# Patient Record
Sex: MALE | Race: OTHER | NOT HISPANIC OR LATINO | Employment: FULL TIME | URBAN - METROPOLITAN AREA
[De-identification: names, ages, dates, MRNs, and addresses within clinical notes are randomized per-mention and may not be internally consistent; named-entity substitution may affect disease eponyms.]

---

## 2024-07-24 ENCOUNTER — OFFICE VISIT (OUTPATIENT)
Dept: ORTHOPEDIC SURGERY | Facility: HOSPITAL | Age: 34
End: 2024-07-24
Payer: COMMERCIAL

## 2024-07-24 ENCOUNTER — HOSPITAL ENCOUNTER (OUTPATIENT)
Dept: RADIOLOGY | Facility: HOSPITAL | Age: 34
Discharge: HOME | End: 2024-07-24
Payer: COMMERCIAL

## 2024-07-24 DIAGNOSIS — M25.561 RIGHT KNEE PAIN, UNSPECIFIED CHRONICITY: ICD-10-CM

## 2024-07-24 DIAGNOSIS — S83.511A DISRUPTION OF ANTERIOR CRUCIATE LIGAMENT OF KNEE, RIGHT, INITIAL ENCOUNTER: Primary | ICD-10-CM

## 2024-07-24 DIAGNOSIS — S83.241A ACUTE MEDIAL MENISCUS TEAR, RIGHT, INITIAL ENCOUNTER: ICD-10-CM

## 2024-07-24 PROCEDURE — 73564 X-RAY EXAM KNEE 4 OR MORE: CPT | Mod: RT

## 2024-07-24 PROCEDURE — 99203 OFFICE O/P NEW LOW 30 MIN: CPT | Performed by: ORTHOPAEDIC SURGERY

## 2024-07-24 PROCEDURE — 73564 X-RAY EXAM KNEE 4 OR MORE: CPT | Mod: RIGHT SIDE | Performed by: RADIOLOGY

## 2024-07-24 PROCEDURE — 99213 OFFICE O/P EST LOW 20 MIN: CPT | Performed by: ORTHOPAEDIC SURGERY

## 2024-07-24 NOTE — PROGRESS NOTES
Chief Complaint: Pain of the Right Knee       HPI:  Tobi Rosa is a 33 y.o. 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  He reports feeling a popping sensation at the time of injury.  He was seen previously in the urgent care where he was provided with a hinged knee brace which is helping his symptoms.  He initially used crutches for ambulation and is currently using a cane on occasion.  Today he does not present with any type of assistive device for ambulation.  He continues to report a popping and buckling sensation over the medial aspect of his knee that worsens with twisting, standing and his combat related activities.  He has used ibuprofen and Tylenol with some improvement of his help.  He continues with the brace.  Denies prior right knee injuries.  Denies numbness or tingling of the right lower extremity.  Presents for treatment recommendation.    Objective     ROS:  Constitutional: No fever, no chills, not feeling tired, no recent weight gain and no recent weight loss  ENT: No nosebleeds  Cardiovascular: No chest pain  Respiratory: No shortness of breath and no cough  Gastrointestinal: No abdominal pain, no nausea, no diarrhea, and no vomiting  Musculoskeletal: Positive for right knee pain and swelling  Integumentary: No rashes and no skin lesions  Neurological: No headache  Psychiatric: No sleep disturbances no depression  Endocrine: No muscle weakness and no muscle cramps  Hematologic/lymphatic: No swelling glands and no tendency for easy bruising    History reviewed. No pertinent past medical history.     Past Surgical History:   Procedure Laterality Date    OTHER SURGICAL HISTORY  02/02/2021    No history of surgery        Social Connections: Not on file          Physical Exam:  General appearance: WN, WD male, in no acute distress  Skin: No rashes, lesions or wounds  Head:  Normocephalic, no evidence of trauma  Eye: EOMI, conjunctiva clear, no discharge  ENT: Nares patent  Neck: No abnormal contour, tracheal midline  Chest/lungs: No respiratory distress, speaking in complete sentences  Musculoskeletal: Tender to palpation over the medial joint line.  POSITIVE Lachman's.  POSITIVE David.  Stable valgus stress test.  Stable varus stress test.  He is able to perform an isometric quadriceps contraction and straight leg raise with some difficulty and no lag.  Knee flexion to 110 degrees.  Moderate effusion.  No decreased ROM, muscle wasting, rigidity    Neurological: A&O x3, no focal deficits, intact bilateral LE  Psych: normal affect, mood, appearance      Image Results:  X-rays taken on 7/24/2024 were reviewed with the patient in the office today and reveal no acute fractures or dislocations.      Assessment/Plan   Encounter Diagnoses:  Disruption of anterior cruciate ligament of knee, right, initial encounter    Acute medial meniscus tear, right, initial encounter    Right knee pain, unspecified chronicity    Orders Placed This Encounter    XR knee right 4+ views    MR knee right wo IV contrast    Referral to Physical Therapy       The patient and I discussed his clinical presentation and physical exam findings consistent with right knee anterior cruciate ligament tear and medial meniscus tear.  We discussed his conservative and surgical treatment options.  We agreed upon an MRI of the right knee to evaluate the integrity of the anterior cruciate ligament and medial meniscus.  I feel the MRI is medically indicated and necessary based upon his mechanism of injury (a planted right leg with a twist and popping sensation), physical exam findings (positive Lachman's and positive David's with moderate joint effusion) and diminished function of his right lower extremity.  Please have the MRI performed in a hospital setting so that it can be easily viewed by the physician as this will be  used as a presurgical planning tool.    Additionally, we provided him with a referral to physical therapy to focus on quadriceps, gluteus, core strength and stability, edema control and home exercise program.  He will ice his knee 15-20 minutes at a time 2 or 3 times per day.  He will elevate his extremity is much as possible.  He may obtain a compression sleeve to be worn while ambulatory.  He can continue with the hinged knee brace he was given at the urgent care.  He was provided a note for work to be off work until his MRI and follow-up.  I will call him with the results of his MRI for further treatment options.  He is in agreement this plan.  All of his questions have been answered.    Dr. Neil Shaver met with evaluated the patient and formulated the treatment plan today.    ** This office note was dictated using Dragon voice to text software and was not proofread for spelling or grammatical errors **

## 2024-07-24 NOTE — LETTER
July 24, 2024     Patient: Tobi Rosa   YOB: 1990   Date of Visit: 7/24/2024       To Whom It May Concern:    It is my medical opinion that Tobi Rosa should remain out of work until his follow up for his right knee and MRI review .    If you have any questions or concerns, please don't hesitate to call.         Sincerely,        Neil Shaver MD    CC: No Recipients

## 2024-08-02 ENCOUNTER — APPOINTMENT (OUTPATIENT)
Dept: RADIOLOGY | Facility: HOSPITAL | Age: 34
End: 2024-08-02
Payer: COMMERCIAL

## 2024-08-07 ENCOUNTER — TELEPHONE (OUTPATIENT)
Dept: ORTHOPEDIC SURGERY | Facility: HOSPITAL | Age: 34
End: 2024-08-07
Payer: COMMERCIAL

## 2024-08-07 NOTE — TELEPHONE ENCOUNTER
Spoke to Tobi today regarding his MRI of his right knee.  Unfortunately, as we suspected in the office, he did suffer an anterior cruciate ligament tear.  He desires to continue to be active and would like to have further surgical discussions regarding an ACL reconstruction.  We will have him see Dr. Sneed for surgical consultation.  He is in agreement this plan.  All of his questions have been answered.

## 2024-08-08 ENCOUNTER — PREP FOR PROCEDURE (OUTPATIENT)
Dept: ORTHOPEDIC SURGERY | Facility: HOSPITAL | Age: 34
End: 2024-08-08
Payer: COMMERCIAL

## 2024-08-08 DIAGNOSIS — S83.511D DISRUPTION OF ANTERIOR CRUCIATE LIGAMENT OF KNEE, RIGHT, SUBSEQUENT ENCOUNTER: ICD-10-CM

## 2024-08-12 ENCOUNTER — OFFICE VISIT (OUTPATIENT)
Dept: ORTHOPEDIC SURGERY | Facility: HOSPITAL | Age: 34
End: 2024-08-12
Payer: COMMERCIAL

## 2024-08-12 ENCOUNTER — CLINICAL SUPPORT (OUTPATIENT)
Dept: PREADMISSION TESTING | Facility: HOSPITAL | Age: 34
End: 2024-08-12
Payer: COMMERCIAL

## 2024-08-12 VITALS — BODY MASS INDEX: 30.8 KG/M2 | WEIGHT: 220 LBS | HEIGHT: 71 IN

## 2024-08-12 DIAGNOSIS — S83.511D DISRUPTION OF ANTERIOR CRUCIATE LIGAMENT OF KNEE, RIGHT, SUBSEQUENT ENCOUNTER: Primary | ICD-10-CM

## 2024-08-12 DIAGNOSIS — S83.511D DISRUPTION OF ANTERIOR CRUCIATE LIGAMENT OF KNEE, RIGHT, SUBSEQUENT ENCOUNTER: ICD-10-CM

## 2024-08-12 PROCEDURE — 99214 OFFICE O/P EST MOD 30 MIN: CPT | Performed by: ORTHOPAEDIC SURGERY

## 2024-08-12 RX ORDER — BISMUTH SUBSALICYLATE 262 MG
1 TABLET,CHEWABLE ORAL DAILY
COMMUNITY

## 2024-08-12 NOTE — PROGRESS NOTES
Chief Complaint  Right knee injury    History of Present Illness   Tobi Rosa is 33 y.o. presenting for a new patient evaluation of an acute right knee injury while playing [employer] fighting.  The patient states his opponent landed onto his knee.  This occurred in the middle of July.  He had immediate pain and swelling and was unable to ambulate.  He saw Swapnil Nick PA-C who obtained an MRI.  He was given a brace.  The patient has been icing and doing a home exercise program.  He has been taking Motrin for pain.  He is now able to ambulate in a brace without the use of crutches.  He reports feelings of instability in the knee.  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  He denies any previous injuries to this knee.  He denies any numbness or tingling in the foot.  He has no other musculoskeletal complaints today.    Desires to remain active in sports.      History reviewed. No pertinent past medical history.    Medication Documentation Review Audit       Reviewed by Marissa Ballesteros ATC () on 08/12/24 at 1306      Medication Order Taking? Sig Documenting Provider Last Dose Status            No Medications to Display                                   No Known Allergies    Social History     Socioeconomic History    Marital status: Single     Spouse name: Not on file    Number of children: Not on file    Years of education: Not on file    Highest education level: Not on file   Occupational History    Not on file   Tobacco Use    Smoking status: Some Days     Types: Cigarettes    Smokeless tobacco: Current   Vaping Use    Vaping status: Every Day   Substance and Sexual Activity    Alcohol use: Defer    Drug use: Defer    Sexual activity: Defer   Other Topics Concern    Not on file   Social History Narrative    Not on file     Social Determinants of Health     Financial Resource Strain: Not on file   Food Insecurity: Not on file   Transportation Needs: Not on file    Physical Activity: Not on file   Stress: Not on file   Social Connections: Not on file   Intimate Partner Violence: Not on file   Housing Stability: Not on file       Past Surgical History:   Procedure Laterality Date    OTHER SURGICAL HISTORY  02/02/2021    No history of surgery          Review of Systems   GENERAL: Negative for malaise, significant weight loss, fever  MUSCULOSKELETAL: See HPI  NEURO:  Negative for numbness / tingling      Physical Exam  side: right Knee:  Skin healthy and intact  Effusion: mild  No gross varus or valgus alignment   Range of motion: 0 and 130     Tenderness to palpation over medial joint line. No lateral TTP.   No TTP over LCL.   No significant laxity to valgus stress  Minimal  laxity to varus stress when compared to contralateral sign.   Positive Lachman´s test  Positive anterior drawer test  Negative posterior drawer test  Deferred David´s test  Negative dial test.      Neurovascular exam normal distally.      Imaging  Radiographs demonstrate healthy joint spaces with no evidence of significant degenerative changes or fractures     MRI of the right knee demonstrates complete ACL rupture. There is a grade 2 LCL injury on MRI. No meniscal injury appreciated.     Assessment:    Patient with a Right ACL rupture.     Plan:  We discussed his diagnosis and treatment options.  The patient desires to remain active in sport.  We recommended ACL reconstruction with bone patellar tendon bone autograft.  We also discussed his MRI findings which demonstrated an injury to the lateral collateral ligament.  However on exam the patient has no tenderness to palpation over the course of the LCL, he has no significant instability with varus stress and he has a normal dial test.  Therefore we will plan for ACL reconstruction with lateral extra-articular tenodesis.  We agreed upon the use of patellar tendon autograft tissue based on his activity level and intact patellofemoral articular cartilage.   We discussed bleeding, infection, blood clot, knee stiffness, persistent pain, and development of posttraumatic degenerative changes.  There is a risk of recurrent ACL injury.  We discussed logistics with regards to physical therapy, exercise and return to work.    A right hinged knee postoperative brace is prescribed for diagnosis of right knee ACL tear.  The brace is prescribed for protection of the ACL reconstruction, pain control and ambulation.    Patient will follow up post-operatively.       Kendell Beth MD  Sports Medicine Fellow  Orthopaedic Surgery

## 2024-08-12 NOTE — H&P (VIEW-ONLY)
Chief Complaint  Right knee injury    History of Present Illness   Tobi Rosa is 33 y.o. presenting for a new patient evaluation of an acute right knee injury while playing [employer] fighting.  The patient states his opponent landed onto his knee.  This occurred in the middle of July.  He had immediate pain and swelling and was unable to ambulate.  He saw Swapnil Nick PA-C who obtained an MRI.  He was given a brace.  The patient has been icing and doing a home exercise program.  He has been taking Motrin for pain.  He is now able to ambulate in a brace without the use of crutches.  He reports feelings of instability in the knee.  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  He denies any previous injuries to this knee.  He denies any numbness or tingling in the foot.  He has no other musculoskeletal complaints today.    Desires to remain active in sports.      History reviewed. No pertinent past medical history.    Medication Documentation Review Audit       Reviewed by Marissa Ballesteros ATC () on 08/12/24 at 1306      Medication Order Taking? Sig Documenting Provider Last Dose Status            No Medications to Display                                   No Known Allergies    Social History     Socioeconomic History    Marital status: Single     Spouse name: Not on file    Number of children: Not on file    Years of education: Not on file    Highest education level: Not on file   Occupational History    Not on file   Tobacco Use    Smoking status: Some Days     Types: Cigarettes    Smokeless tobacco: Current   Vaping Use    Vaping status: Every Day   Substance and Sexual Activity    Alcohol use: Defer    Drug use: Defer    Sexual activity: Defer   Other Topics Concern    Not on file   Social History Narrative    Not on file     Social Determinants of Health     Financial Resource Strain: Not on file   Food Insecurity: Not on file   Transportation Needs: Not on file    Physical Activity: Not on file   Stress: Not on file   Social Connections: Not on file   Intimate Partner Violence: Not on file   Housing Stability: Not on file       Past Surgical History:   Procedure Laterality Date    OTHER SURGICAL HISTORY  02/02/2021    No history of surgery          Review of Systems   GENERAL: Negative for malaise, significant weight loss, fever  MUSCULOSKELETAL: See HPI  NEURO:  Negative for numbness / tingling      Physical Exam  side: right Knee:  Skin healthy and intact  Effusion: mild  No gross varus or valgus alignment   Range of motion: 0 and 130     Tenderness to palpation over medial joint line. No lateral TTP.   No TTP over LCL.   No significant laxity to valgus stress  Minimal  laxity to varus stress when compared to contralateral sign.   Positive Lachman´s test  Positive anterior drawer test  Negative posterior drawer test  Deferred David´s test  Negative dial test.      Neurovascular exam normal distally.      Imaging  Radiographs demonstrate healthy joint spaces with no evidence of significant degenerative changes or fractures     MRI of the right knee demonstrates complete ACL rupture. There is a grade 2 LCL injury on MRI. No meniscal injury appreciated.     Assessment:    Patient with a Right ACL rupture.     Plan:  We discussed his diagnosis and treatment options.  The patient desires to remain active in sport.  We recommended ACL reconstruction with bone patellar tendon bone autograft.  We also discussed his MRI findings which demonstrated an injury to the lateral collateral ligament.  However on exam the patient has no tenderness to palpation over the course of the LCL, he has no significant instability with varus stress and he has a normal dial test.  Therefore we will plan for ACL reconstruction with lateral extra-articular tenodesis.  We agreed upon the use of patellar tendon autograft tissue based on his activity level and intact patellofemoral articular cartilage.   We discussed bleeding, infection, blood clot, knee stiffness, persistent pain, and development of posttraumatic degenerative changes.  There is a risk of recurrent ACL injury.  We discussed logistics with regards to physical therapy, exercise and return to work.    A right hinged knee postoperative brace is prescribed for diagnosis of right knee ACL tear.  The brace is prescribed for protection of the ACL reconstruction, pain control and ambulation.    Patient will follow up post-operatively.       Kendell Beth MD  Sports Medicine Fellow  Orthopaedic Surgery

## 2024-08-12 NOTE — LETTER
August 12, 2024     Patient: Tobi Rosa   YOB: 1990   Date of Visit: 8/12/2024       To Whom It May Concern:    It is my medical opinion that Tobi Rosa must remain off work for at least 3 months following surgery. Continued work status to be determined at subsequent appointments.    If you have any questions or concerns, please don't hesitate to call.         Sincerely,        Alexandre Sneed MD    CC: No Recipients

## 2024-08-12 NOTE — PREPROCEDURE INSTRUCTIONS
Current Medications   Medication Instructions    multivitamin tablet Hold 7 days prior to surgery                       NPO Instructions:    Do not eat any food after midnight the night before your surgery/procedure.    Additional Instructions:     Seven/Six Days before Surgery:  Review your medication instructions, stop indicated medications  Five Days before Surgery:  Review your medication instructions, stop indicated medications  Three Days before Surgery:  Review your medication instructions, stop indicated medications  The Day before Surgery:  Review your medication instructions, stop indicated medications  You will be contacted regarding the time of your arrival to facility and surgery time  Do not eat any food after Midnight  Day of Surgery:  Review your medication instructions, take indicated medications  Wear  comfortable loose fitting clothing  Do not use moisturizers, creams, lotions or perfume  All jewelry and valuables should be left at home  PAT PRE-OPERATIVE INSTRUCTIONS    [facility]  [address] [address] [place] [address]  [phone]    Please let your surgeon know if:      You develop:  Open sores, shingles, burning or painful urination as these may increase your risk of an infection.   Fever=100.4 or greater   New or worsening cold or flu symptoms ( cough, shortness of breath, sore throat, respiratory distress, headache, fatigue, GI symptoms)   You no longer wish to have the surgery.   Any other personal circumstances change that may lead to the need to cancel or defer this surgery-such as being sick or getting admitted to any hospital within one week of your planned procedure.    Your contact details change, such as a change of address or phone number.    Starting now:     Please DO NOT drink alcohol or smoke for 24 hours before surgery. It is well known that quitting smoking can make a huge difference to your health and recovery from surgery. The longer  you abstain from smoking, the better your chances of a healthy recovery. If you need help with quitting, call 5-800QUIT-NOW to be connected to a trained counselor who will discuss the best methods to help you quit.     Before your surgery:    Please stop all supplements/ vitamins 7 days prior to surgery (or as directed by your surgeon).   Please stop taking NSAID pain medicine such as Advil, Ibuprofen, and Motrin 7 days before surgery.    If you develop any fever, cough, cold, rashes, cuts, scratches, scrapes, urinary symptoms or infection anywhere on your body (including teeth and gums) prior to surgery, please call your surgeon’s office as soon as possible. This may require treatment to reduce the chance of cancellation on the day of surgery.    The day before your surgery:   DIET- Do not eat any food after MIDNIGHT.   Get a good night’s rest.  Use the special soap for bathing if you have been instructed to use one.    Scheduled surgery times may change and you will be notified if this occurs - please check your personal voicemail for any updates.     On the morning of surgery:   Wear comfortable, loose fitting clothes which open in the front.   Shower and please do not wear moisturizers, creams, lotions, deodorants, makeup or perfume.    Please bring with you to surgery:   Photo ID and insurance card   Current list of medicines and allergies   Pacemaker/ Defibrillator/Heart stent cards as well as remote controls for implanted devices    CPAP machine and mask    Slings/ splints/ crutches   A copy of your complete advanced directive/DHPOA.    Please do NOT bring with you to surgery:   All jewelry and valuables should be left at home.   Prosthetic devices such as contact lenses, glasses, hearing aids, dentures, eyelash extensions, hairpins and body piercings must be removed prior to going in to the surgical suite. If you have a case for these items, please bring it with you on surgery day.    *Patients under the  age of 18: A responsible adult must be present and remaining in the building throughout the surgical visit.    After outpatient surgery:   A responsible adult MUST accompany you at the time of discharge and stay with you for 24 hours after your surgery. You may NOT drive yourself home after surgery.    Do not drive, operate machinery, make critical decisions or do activities that require co-ordination or balance until after a night’s sleep.   Do not drink alcoholic beverages for 24 hours.   Instructions for resuming your medications will be provided by your surgeon.    CALL YOUR DOCTOR AFTER SURGERY IF YOU HAVE:     Chills and/or a fever of 101° F or higher.    Redness, swelling, pus or drainage from your surgical wound or a bad smell from the wound.    Lightheadedness, fainting or confusion.    Persistent vomiting (throwing up) and are not able to eat or drink for 12 hours.    Three or more loose, watery bowel movements in 24 hours (diarrhea).   Difficulty or pain while urinating( after non-urological surgery)    Pain and swelling in your legs, especially if it is only on one side.    Difficulty breathing or are breathing faster than normal.    Any new concerning symptoms.      Reviewed pre-op instructions with patient, states understanding and denies further questions at this time.      Take Care

## 2024-08-13 DIAGNOSIS — S83.511A DISRUPTION OF ANTERIOR CRUCIATE LIGAMENT OF KNEE, RIGHT, INITIAL ENCOUNTER: Primary | ICD-10-CM

## 2024-08-20 ENCOUNTER — ANESTHESIA (OUTPATIENT)
Dept: OPERATING ROOM | Facility: HOSPITAL | Age: 34
End: 2024-08-20
Payer: COMMERCIAL

## 2024-08-20 ENCOUNTER — ANESTHESIA EVENT (OUTPATIENT)
Dept: OPERATING ROOM | Facility: HOSPITAL | Age: 34
End: 2024-08-20
Payer: COMMERCIAL

## 2024-08-20 ENCOUNTER — HOSPITAL ENCOUNTER (OUTPATIENT)
Facility: HOSPITAL | Age: 34
Setting detail: OUTPATIENT SURGERY
Discharge: HOME | End: 2024-08-20
Attending: ORTHOPAEDIC SURGERY | Admitting: ORTHOPAEDIC SURGERY
Payer: COMMERCIAL

## 2024-08-20 VITALS
SYSTOLIC BLOOD PRESSURE: 152 MMHG | BODY MASS INDEX: 30.19 KG/M2 | DIASTOLIC BLOOD PRESSURE: 82 MMHG | HEART RATE: 79 BPM | OXYGEN SATURATION: 97 % | WEIGHT: 215.61 LBS | HEIGHT: 71 IN | RESPIRATION RATE: 18 BRPM | TEMPERATURE: 97.7 F

## 2024-08-20 DIAGNOSIS — S83.511D DISRUPTION OF ANTERIOR CRUCIATE LIGAMENT OF KNEE, RIGHT, SUBSEQUENT ENCOUNTER: Primary | ICD-10-CM

## 2024-08-20 PROCEDURE — A29888 PR KNEE SCOPE,AID ANT CRUCIATE REPAIR: Performed by: ANESTHESIOLOGIST ASSISTANT

## 2024-08-20 PROCEDURE — 29888 ARTHRS AID ACL RPR/AGMNTJ: CPT | Performed by: ORTHOPAEDIC SURGERY

## 2024-08-20 PROCEDURE — 7100000002 HC RECOVERY ROOM TIME - EACH INCREMENTAL 1 MINUTE: Performed by: ORTHOPAEDIC SURGERY

## 2024-08-20 PROCEDURE — 27405 REPAIR OF KNEE LIGAMENT: CPT | Performed by: ORTHOPAEDIC SURGERY

## 2024-08-20 PROCEDURE — 3600000009 HC OR TIME - EACH INCREMENTAL 1 MINUTE - PROCEDURE LEVEL FOUR: Performed by: ORTHOPAEDIC SURGERY

## 2024-08-20 PROCEDURE — C1713 ANCHOR/SCREW BN/BN,TIS/BN: HCPCS | Performed by: ORTHOPAEDIC SURGERY

## 2024-08-20 PROCEDURE — 29882 ARTHRS KNE SRG MNISC RPR M/L: CPT | Performed by: ORTHOPAEDIC SURGERY

## 2024-08-20 PROCEDURE — 2500000004 HC RX 250 GENERAL PHARMACY W/ HCPCS (ALT 636 FOR OP/ED): Performed by: ANESTHESIOLOGY

## 2024-08-20 PROCEDURE — 2500000004 HC RX 250 GENERAL PHARMACY W/ HCPCS (ALT 636 FOR OP/ED): Performed by: PHYSICIAN ASSISTANT

## 2024-08-20 PROCEDURE — A4550 SURGICAL TRAYS: HCPCS | Performed by: ORTHOPAEDIC SURGERY

## 2024-08-20 PROCEDURE — 7100000010 HC PHASE TWO TIME - EACH INCREMENTAL 1 MINUTE: Performed by: ORTHOPAEDIC SURGERY

## 2024-08-20 PROCEDURE — 2780000003 HC OR 278 NO HCPCS: Performed by: ORTHOPAEDIC SURGERY

## 2024-08-20 PROCEDURE — 2500000004 HC RX 250 GENERAL PHARMACY W/ HCPCS (ALT 636 FOR OP/ED): Performed by: ORTHOPAEDIC SURGERY

## 2024-08-20 PROCEDURE — 2500000004 HC RX 250 GENERAL PHARMACY W/ HCPCS (ALT 636 FOR OP/ED): Performed by: ANESTHESIOLOGIST ASSISTANT

## 2024-08-20 PROCEDURE — 29888 ARTHRS AID ACL RPR/AGMNTJ: CPT | Performed by: PHYSICIAN ASSISTANT

## 2024-08-20 PROCEDURE — 2500000005 HC RX 250 GENERAL PHARMACY W/O HCPCS: Performed by: ANESTHESIOLOGIST ASSISTANT

## 2024-08-20 PROCEDURE — 7100000009 HC PHASE TWO TIME - INITIAL BASE CHARGE: Performed by: ORTHOPAEDIC SURGERY

## 2024-08-20 PROCEDURE — 7100000001 HC RECOVERY ROOM TIME - INITIAL BASE CHARGE: Performed by: ORTHOPAEDIC SURGERY

## 2024-08-20 PROCEDURE — 3600000004 HC OR TIME - INITIAL BASE CHARGE - PROCEDURE LEVEL FOUR: Performed by: ORTHOPAEDIC SURGERY

## 2024-08-20 PROCEDURE — 3700000001 HC GENERAL ANESTHESIA TIME - INITIAL BASE CHARGE: Performed by: ORTHOPAEDIC SURGERY

## 2024-08-20 PROCEDURE — A29888 PR KNEE SCOPE,AID ANT CRUCIATE REPAIR: Performed by: ANESTHESIOLOGY

## 2024-08-20 PROCEDURE — 3700000002 HC GENERAL ANESTHESIA TIME - EACH INCREMENTAL 1 MINUTE: Performed by: ORTHOPAEDIC SURGERY

## 2024-08-20 PROCEDURE — 2500000005 HC RX 250 GENERAL PHARMACY W/O HCPCS: Performed by: ORTHOPAEDIC SURGERY

## 2024-08-20 PROCEDURE — 2720000007 HC OR 272 NO HCPCS: Performed by: ORTHOPAEDIC SURGERY

## 2024-08-20 DEVICE — BIOSURE REGENSORB INTERFERENCE                                    SCREW 6 MM X 20MM
Type: IMPLANTABLE DEVICE | Site: KNEE | Status: FUNCTIONAL
Brand: BIOSURE

## 2024-08-20 DEVICE — BIOSURE REGENSORB INTERFERENCE                                    SCREW 7 MM X 25MM
Type: IMPLANTABLE DEVICE | Site: KNEE | Status: FUNCTIONAL
Brand: BIOSURE

## 2024-08-20 DEVICE — TRUESPAN MENISCAL REPAIR SYSTEM PEEK 12 DEGREES ORTHOCORD VIOLET BRAIDED COMPOSITE SUTURE SIZE 2-0, (2) PEEK BACKSTOPS, AND (1) APPLIER WITH 12 DEGREES NEEDLE DEPTH STOP: 10MM-20MM PLUS OR MINUS 1MM; NEEDLE: 10MM PLUS OR MINUS .13MM
Type: IMPLANTABLE DEVICE | Site: KNEE | Status: FUNCTIONAL
Brand: TRUESPAN ORTHOCORD

## 2024-08-20 DEVICE — DOUBLE LOADED KNOTLESS FIBERTAK, KNEE
Type: IMPLANTABLE DEVICE | Site: KNEE | Status: FUNCTIONAL
Brand: ARTHREX®

## 2024-08-20 RX ORDER — PROPOFOL 10 MG/ML
INJECTION, EMULSION INTRAVENOUS AS NEEDED
Status: DISCONTINUED | OUTPATIENT
Start: 2024-08-20 | End: 2024-08-20

## 2024-08-20 RX ORDER — ROPIVACAINE HYDROCHLORIDE 5 MG/ML
INJECTION, SOLUTION EPIDURAL; INFILTRATION; PERINEURAL AS NEEDED
Status: DISCONTINUED | OUTPATIENT
Start: 2024-08-20 | End: 2024-08-20

## 2024-08-20 RX ORDER — OXYCODONE AND ACETAMINOPHEN 5; 325 MG/1; MG/1
1 TABLET ORAL EVERY 6 HOURS PRN
Qty: 20 TABLET | Refills: 0 | Status: SHIPPED | OUTPATIENT
Start: 2024-08-20

## 2024-08-20 RX ORDER — HYDRALAZINE HYDROCHLORIDE 20 MG/ML
5 INJECTION INTRAMUSCULAR; INTRAVENOUS EVERY 30 MIN PRN
Status: DISCONTINUED | OUTPATIENT
Start: 2024-08-20 | End: 2024-08-20 | Stop reason: HOSPADM

## 2024-08-20 RX ORDER — DEXAMETHASONE SODIUM PHOSPHATE 10 MG/ML
INJECTION INTRAMUSCULAR; INTRAVENOUS AS NEEDED
Status: DISCONTINUED | OUTPATIENT
Start: 2024-08-20 | End: 2024-08-20

## 2024-08-20 RX ORDER — FENTANYL CITRATE 50 UG/ML
100 INJECTION, SOLUTION INTRAMUSCULAR; INTRAVENOUS ONCE
Status: COMPLETED | OUTPATIENT
Start: 2024-08-20 | End: 2024-08-20

## 2024-08-20 RX ORDER — ONDANSETRON 4 MG/1
4 TABLET, FILM COATED ORAL EVERY 8 HOURS PRN
Qty: 20 TABLET | Refills: 0 | Status: SHIPPED | OUTPATIENT
Start: 2024-08-20

## 2024-08-20 RX ORDER — BUPIVACAINE HYDROCHLORIDE 2.5 MG/ML
INJECTION, SOLUTION INFILTRATION; PERINEURAL AS NEEDED
Status: DISCONTINUED | OUTPATIENT
Start: 2024-08-20 | End: 2024-08-20

## 2024-08-20 RX ORDER — ONDANSETRON HYDROCHLORIDE 2 MG/ML
INJECTION, SOLUTION INTRAVENOUS AS NEEDED
Status: DISCONTINUED | OUTPATIENT
Start: 2024-08-20 | End: 2024-08-20

## 2024-08-20 RX ORDER — KETOROLAC TROMETHAMINE 30 MG/ML
INJECTION, SOLUTION INTRAMUSCULAR; INTRAVENOUS AS NEEDED
Status: DISCONTINUED | OUTPATIENT
Start: 2024-08-20 | End: 2024-08-20

## 2024-08-20 RX ORDER — ASPIRIN 325 MG
325 TABLET, DELAYED RELEASE (ENTERIC COATED) ORAL DAILY
Qty: 15 TABLET | Refills: 0 | Status: SHIPPED | OUTPATIENT
Start: 2024-08-21

## 2024-08-20 RX ORDER — ONDANSETRON HYDROCHLORIDE 2 MG/ML
4 INJECTION, SOLUTION INTRAVENOUS ONCE AS NEEDED
Status: DISCONTINUED | OUTPATIENT
Start: 2024-08-20 | End: 2024-08-20 | Stop reason: HOSPADM

## 2024-08-20 RX ORDER — BUPIVACAINE HYDROCHLORIDE 5 MG/ML
INJECTION, SOLUTION PERINEURAL AS NEEDED
Status: DISCONTINUED | OUTPATIENT
Start: 2024-08-20 | End: 2024-08-20

## 2024-08-20 RX ORDER — LABETALOL HYDROCHLORIDE 5 MG/ML
5 INJECTION, SOLUTION INTRAVENOUS ONCE AS NEEDED
Status: DISCONTINUED | OUTPATIENT
Start: 2024-08-20 | End: 2024-08-20 | Stop reason: HOSPADM

## 2024-08-20 RX ORDER — CEFAZOLIN SODIUM 2 G/100ML
2 INJECTION, SOLUTION INTRAVENOUS ONCE
Status: COMPLETED | OUTPATIENT
Start: 2024-08-20 | End: 2024-08-20

## 2024-08-20 RX ORDER — ALBUTEROL SULFATE 0.83 MG/ML
2.5 SOLUTION RESPIRATORY (INHALATION) ONCE AS NEEDED
Status: DISCONTINUED | OUTPATIENT
Start: 2024-08-20 | End: 2024-08-20 | Stop reason: HOSPADM

## 2024-08-20 RX ORDER — FENTANYL CITRATE 50 UG/ML
50 INJECTION, SOLUTION INTRAMUSCULAR; INTRAVENOUS EVERY 5 MIN PRN
Status: DISCONTINUED | OUTPATIENT
Start: 2024-08-20 | End: 2024-08-20 | Stop reason: HOSPADM

## 2024-08-20 RX ORDER — SODIUM CHLORIDE, SODIUM LACTATE, POTASSIUM CHLORIDE, CALCIUM CHLORIDE 600; 310; 30; 20 MG/100ML; MG/100ML; MG/100ML; MG/100ML
100 INJECTION, SOLUTION INTRAVENOUS CONTINUOUS
Status: DISCONTINUED | OUTPATIENT
Start: 2024-08-20 | End: 2024-08-20 | Stop reason: HOSPADM

## 2024-08-20 RX ORDER — DOCUSATE SODIUM 100 MG/1
100 CAPSULE, LIQUID FILLED ORAL 2 TIMES DAILY
Qty: 30 CAPSULE | Refills: 0 | Status: SHIPPED | OUTPATIENT
Start: 2024-08-20 | End: 2024-09-04

## 2024-08-20 RX ORDER — MIDAZOLAM HYDROCHLORIDE 1 MG/ML
2 INJECTION, SOLUTION INTRAMUSCULAR; INTRAVENOUS ONCE
Status: COMPLETED | OUTPATIENT
Start: 2024-08-20 | End: 2024-08-20

## 2024-08-20 RX ORDER — MEPERIDINE HYDROCHLORIDE 25 MG/ML
12.5 INJECTION INTRAMUSCULAR; INTRAVENOUS; SUBCUTANEOUS EVERY 10 MIN PRN
Status: DISCONTINUED | OUTPATIENT
Start: 2024-08-20 | End: 2024-08-20 | Stop reason: HOSPADM

## 2024-08-20 RX ORDER — LIDOCAINE HYDROCHLORIDE 10 MG/ML
INJECTION, SOLUTION EPIDURAL; INFILTRATION; INTRACAUDAL; PERINEURAL AS NEEDED
Status: DISCONTINUED | OUTPATIENT
Start: 2024-08-20 | End: 2024-08-20

## 2024-08-20 RX ORDER — FENTANYL CITRATE 50 UG/ML
INJECTION, SOLUTION INTRAMUSCULAR; INTRAVENOUS AS NEEDED
Status: DISCONTINUED | OUTPATIENT
Start: 2024-08-20 | End: 2024-08-20

## 2024-08-20 SDOH — HEALTH STABILITY: MENTAL HEALTH: CURRENT SMOKER: 1

## 2024-08-20 ASSESSMENT — PAIN SCALES - GENERAL
PAINLEVEL_OUTOF10: 8
PAINLEVEL_OUTOF10: 0 - NO PAIN
PAINLEVEL_OUTOF10: 0 - NO PAIN
PAINLEVEL_OUTOF10: 6
PAINLEVEL_OUTOF10: 8
PAINLEVEL_OUTOF10: 9
PAINLEVEL_OUTOF10: 0 - NO PAIN
PAINLEVEL_OUTOF10: 0 - NO PAIN
PAINLEVEL_OUTOF10: 8
PAINLEVEL_OUTOF10: 9
PAIN_LEVEL: 6

## 2024-08-20 ASSESSMENT — COLUMBIA-SUICIDE SEVERITY RATING SCALE - C-SSRS
6. HAVE YOU EVER DONE ANYTHING, STARTED TO DO ANYTHING, OR PREPARED TO DO ANYTHING TO END YOUR LIFE?: NO
1. IN THE PAST MONTH, HAVE YOU WISHED YOU WERE DEAD OR WISHED YOU COULD GO TO SLEEP AND NOT WAKE UP?: NO
2. HAVE YOU ACTUALLY HAD ANY THOUGHTS OF KILLING YOURSELF?: NO

## 2024-08-20 NOTE — ANESTHESIA PREPROCEDURE EVALUATION
Patient: Tobi Rosa    Procedure Information       Date/Time: 08/20/24 0815    Procedure: RIGHT Knee ACL Reconstruction with patella tendon autograft (LMA/FNB) (S&N ACL set, meniscus Repair devices, suture anchors) (Right: Knee) - LMA/FNB    Location: [facility] / [facility]    Surgeons: Alexandre Sneed MD            Relevant Problems   No relevant active problems       Clinical information reviewed:   Tobacco  Allergies  Meds   Med Hx  Surg Hx   Fam Hx  Soc Hx        NPO Detail:  NPO/Void Status  Date of Last Liquid: 08/19/24  Time of Last Liquid: 2359  Date of Last Solid: 08/19/24  Time of Last Solid: 2300  Time of Last Void: 0650         Physical Exam    Airway  Mallampati: I  TM distance: >3 FB  Neck ROM: full  Comments: Full beard     Cardiovascular   Comments: deferred   Dental    Pulmonary   Comments: deferred   Abdominal     Comments: deferred           Anesthesia Plan    History of general anesthesia?: no  History of complications of general anesthesia?: unknown/emergency    ASA 2     general and regional     The patient is a current smoker. (marijuana. formerly cigarettes)  Patient was previously instructed to abstain from smoking on day of procedure.  Patient did not smoke on day of procedure.  Education provided regarding risk of obstructive sleep apnea.  intravenous induction   Postoperative administration of opioids is intended.  Anesthetic plan and risks discussed with patient.    Plan discussed with CAA.

## 2024-08-20 NOTE — PERIOPERATIVE NURSING NOTE
Pt continues to have pain, Fent used intra-op and PACU, does not seem to alleviate pt discomfort.  Attending anesthesia updated on pt pain status. V.O to use dilaudid order for higher pain score.

## 2024-08-20 NOTE — ANESTHESIA POSTPROCEDURE EVALUATION
Patient: Tobi Rosa    Procedure Summary       Date: 08/20/24 Room / Location: [facility] / [facility]    Anesthesia Start: 0754 Anesthesia Stop: 1030    Procedure: RIGHT Knee ACL Reconstruction with patella tendon autograft and lateral extra-articular tenodesis  (LMA/FNB) (S&N ACL set, meniscus Repair devices, suture anchors) (Right: Knee) Diagnosis:       Disruption of anterior cruciate ligament of knee, right, subsequent encounter      (Disruption of anterior cruciate ligament of knee, right, subsequent encounter [S83.366D])    Surgeons: Alexandre Sneed MD Responsible Provider: Kendell Emmanuel MD    Anesthesia Type: general, regional ASA Status: 2            Anesthesia Type: general, regional    Vitals Value Taken Time   /75 08/20/24 1150   Temp 37 °C (98.6 °F) 08/20/24 1023   Pulse 58 08/20/24 1150   Resp 15 08/20/24 1150   SpO2 98 % 08/20/24 1150       Anesthesia Post Evaluation    Patient location during evaluation: PACU  Patient participation: complete - patient participated  Level of consciousness: awake and alert  Pain score: 6  Pain management: satisfactory to patient  Multimodal analgesia pain management approach  Airway patency: patent  Two or more strategies used to mitigate risk of obstructive sleep apnea  Cardiovascular status: acceptable and blood pressure returned to baseline  Respiratory status: acceptable  Hydration status: acceptable  Postoperative Nausea and Vomiting: none  Comments: We added a femoral nerve block in the postop area to help reduce the pain score a couple points.  We had done a preop adductor canal block and an IPACK block.  Those proved to be inadequate and therefore the rescue FNB was performed.         There were no known notable events for this encounter.

## 2024-08-20 NOTE — OP NOTE
RIGHT Knee ACL Reconstruction with patellar tendon autograft, lateral meniscus repair (R) Operative Note     Date: 2024  OR Location: [facility]    Name: Tobi Rosa, : 1990, Age: 33 y.o., MRN: [id number], Sex: male    Diagnosis  Pre-op Diagnosis      * Disruption of anterior cruciate ligament of knee, right, subsequent encounter [S83.511D] Post-op Diagnosis     * Disruption of anterior cruciate ligament of knee, right, subsequent encounter [S83.511D]     * Right knee lateral meniscus tear     Procedures  Right knee arthroscopic ACL reconstruction with patella tendon autograft  Right knee arthroscopic all-inside lateral meniscus repair  Right knee open lateral extra-articular tenodesis with iliotibial band    Surgeons      * Alexandre Sneed - Primary    Resident/Fellow/Other Assistant:  Surgeons and Role:     * Vin Guerrero PA-C - Assisting    Procedure Summary  Anesthesia: Regional and LMA ASA: II  Anesthesia Staff: Anesthesiologist: Kendell Emmanuel MD  C-AA: MESSI Kimball  Estimated Blood Loss: 5mL  Intra-op Medications:   Medication Name Total Dose   ceFAZolin in dextrose (iso-os) (Ancef) IVPB 2 g 2 g   fentaNYL PF (Sublimaze) injection 50 mcg 50 mcg   midazolam (Versed) injection 2 mg 2 mg            Anesthesia Record               Intraprocedure I/O Totals          Intake    ceFAZolin in dextrose (iso-os) (Ancef) IVPB 2 g 100.00 mL    Total Intake 100 mL          Specimen: No specimens collected     Staff:   Circulator: Agatha Coronado RN  Relief Scrub: Foreign Sommer  Scrub Person: Mynor Pool; Rosibel Busch RN     Drains and/or Catheters: None    Tourniquet Times: 60 min    Implants:  Implants       Type Name Action Serial No.      Joint Knee MENISCAL REPAIR SYSTEM, TRUESPAN, 12 DEGREE NEEDLE - [id number] Implanted      Screw SCREW, BIOSURE REGENESORB, 6 X 20MM - [id number] Implanted      Screw SCREW, BIOSURE REGENESORB, 7 X 25MM - [id number] Implanted      Screw SCREW, BIOSURE REGENESORB, 7 X 25MM -  [id number] Implanted             Findings: ACL and lateral meniscus tear    Indications: Tobi Rosa is a 33 y.o. male who suffered a right knee anterior cruciate ligament injury during sporting activities. The patient continues to report knee pain, instability and swelling. Physical examination and MRI confirmed findings. Knee arthroscopy and reconstruction is indicated. Risks and benefits were discussed with the patient. After questions were answered consent was obtained to proceed. In the holding area of the right knee was signed as the appropriate operative site, and the patient was positively identified. We agreed upon the use of autograft tissue.    Procedure: In the operative suite the patient was placed supine on the table. An LMA was inserted by the anesthesiologist. A right thigh tourniquet was placed. The right lower extremity was then prepped and draped in the usual sterile fashion. A timeout was performed and 2 g Ancef were given intravenously prior to initiating the procedure.  Right knee examination under anesthesia was performed. Full range of motion. Stable medial collateral ligament.  Trace jog lateral collateral ligament. Stable posterior drawer. Positive Lachman and anterior drawer. Positive pivot glide.  The procedure was initiated by harvesting the patellar tendon through a longitudinal incision over the anterior knee. Superficial bleeders were cauterized. The peritenon was then incised and protected for later repair. The central 10 mm of the patellar tendon was then harvested with attached 10 x 20 mm bone plugs harvested from the tibial tubercle and patella with an oscillating saw, respectively. The graft was harvested without incident and was of excellent quality.  On the back table, JAQUELIN Banda prepared the anterior cruciate ligament graft. The graft was prepared with cylindrical bone plugs through its drill holes were placed for passing sutures. The graft was prepared without incident and  was of excellent quality.  A standard 2 portal diagnostic arthroscopy technique was utilized. The camera was inserted into the joint in an atraumatic fashion.  The suprapatellar pouch and gutters were unremarkable and free of debris.  The patellofemoral articular cartilage was intact.  The medial compartment was then entered. Medial femoral condyle articular cartilage was intact. The medial meniscus was probed and intact.  The lateral compartment was entered. Lateral femoral condyle articular cartilage was intact. The lateral meniscus was probed and revealed a 1 cm posterior vertical tear in red-white zone. All-inside meniscus repair was performed with a Mitek Truespan meniscus repair with a horizontal mattress suture. This was tensioned and excess suture cut. The meniscus was probed and stable with repair complete.  The notch was then entered. The PCL was intact. The anterior cruciate ligament was ruptured at its mid substance. The notch was slightly narrow.  The anterior cruciate ligament was then debrided back to its footprints. A 5.5 mm shaver was utilized to perform a notchplasty along with the use of a 70° arthroscope. This opened up the slightly stenotic notch and allowed for visualization of the back wall. Using a medial portal technique a flexible guidepin was placed in the center of the anterior cruciate ligament footprint. A flexible reamer was then used to drill a 10 x 25 mm tunnel leaving a 2 mm back wall. Care was taken to protect the medial femoral condyle with reaming. The tunnel was reamed without incident and was in an anatomic position. A passing suture was pulled through for later graft passage.  The tibial tunnel was then reamed over a guide pin placed in the center of the anterior cruciate ligament tibial footprint. A 10 mm cylindrical reamer was used to create the tunnel. The reamings were saved for later autogenous bone grafting. The tibial tunnel was in excellent position.  The knee was then  lavaged and suctioned of debris.  Right knee arthroscopic anterior cruciate ligament reconstruction with bone-patellar tendon-bone autograft was then completed by passing the graft into the knee. The femoral bone plug docked nicely.  The femoral bone plug was secured with a 6 x 20 mm bio composite screw for an excellent interference fit.  The knee was then cycled and brought into extension. There was no evidence of notch impingement. Tension was held on the graft and the knee was brought again into extension.  The tibial bone plug was secured with a 7 x 25 mm bio composite screw to complete graft fixation with a secure interference fit.  The anterior cruciate ligament graft was then probed and stable. The Lachman and pivot shift were performed and stable.  Right knee open lateral extra-articular tenodesis was performed through a small accessory lateral incision centered at the lateral epicondyle and extended distally.  An 8 mm strip of iliotibial band was then harvested leaving the distal portion intact to Nidhi's tubercle.  An Arthrex knotless knee fiber tack was then inserted just proximal and posterior to the lateral epicondyle.  The strip of iliotibial band was then pulled through the loops of the anchor and the knee brought to 15 degrees of flexion and neutral rotation.  The sutures were then nicely tensioned securing the iliotibial band to the lateral epicondyle and capsule.  The sutures from the anchor were then passed through the lateral collateral ligament proximally with a free needle and tied securely.  An additional 0 Vicryl suture was placed in the iliotibial band to reinforce the repair.  This completed the lateral extra-articular tenodesis.  The iliotibial band was then closed over the top.  A layered closure was performed in the skin.  The knee was then lavaged and suctioned of debris. Instruments were removed and fluid expelled. Portals were closed with interrupted 3-0 nylon sutures. The central  one third of the patellar tendon was then reapproximated with interrupted 0 Vicryl sutures. The patella and tibial harvest sites were bone grafted with autogenous bone graft. The peritenon was then closed over the top with interrupted 2-0 nylon sutures. A layered closure was performed in the skin followed by running subcuticular Monocryl and Steri-Strips.  All sponge counts and needle counts are correct. There were no complications. A Xeroform and sterile gauze dressing was applied followed by a bulky cotton web roll, Ace wrap and hinged knee brace locked in extension. A cryotherapy device is utilized. The patient was transported awake, extubated, and in stable condition to the recovery room without incident.  Postoperative DVT prophylaxis included aspirin, compressive stockings, active ankle pumps and early ambulation.  JAQUELIN Banda was present for the entire case. His assistance was necessary and critical to the successful completion of the procedure. He provided skilled assistance with preparation of the anterior cruciate ligament graft, graft passage and skin closure. A qualified assistant was not available to perform his portion of the case.    Complications:  None; patient tolerated the procedure well.    Disposition: PACU - hemodynamically stable.  Condition: stable   Attending Attestation:     Alexandre Sneed  Phone Number: [phone]

## 2024-08-20 NOTE — ANESTHESIA PROCEDURE NOTES
Peripheral Block    Patient location during procedure: pre-op  Start time: 8/20/2024 11:32 AM  End time: 8/20/2024 11:35 AM  Reason for block: at surgeon's request and post-op pain management  Staffing  Performed: attending   Authorized by: Kendell Emmanuel MD    Performed by: Kendell Emmanuel MD  Preanesthetic Checklist  Completed: patient identified, IV checked, site marked, risks and benefits discussed, surgical consent, monitors and equipment checked, pre-op evaluation and timeout performed   Timeout performed at: 8/20/2024 11:29 AM  Peripheral Block  Patient position: laying flat  Prep: ChloraPrep and site prepped and draped  Patient monitoring: heart rate, cardiac monitor and continuous pulse ox  Block type: femoral  Laterality: right  Injection technique: single-shot  Guidance: nerve stimulator and ultrasound guided  Needle  Needle gauge: 22 G  Needle length: 5 cm  Needle localization: ultrasound guidance     image stored in chart  Needle insertion depth: 3 cm  Test dose: negative  Assessment  Injection assessment: negative aspiration for heme, no paresthesia on injection, local visualized surrounding nerve on ultrasound and incremental injection  Paresthesia pain: immediately resolved  Heart rate change: no  Slow fractionated injection: yes

## 2024-08-20 NOTE — PERIOPERATIVE NURSING NOTE
Attending anesthesia updated on pt status, will stop @ bedside to evaluate.  Pt VSS, baseline, observed resting with eyes closed.

## 2024-08-20 NOTE — ANESTHESIA PROCEDURE NOTES
Peripheral Block    Patient location during procedure: pre-op  Start time: 8/20/2024 7:34 AM  End time: 8/20/2024 7:35 AM  Reason for block: at surgeon's request and post-op pain management  Staffing  Performed: attending   Authorized by: Kendell Emmanuel MD    Performed by: Kendell Emmanuel MD  Preanesthetic Checklist  Completed: patient identified, IV checked, site marked, risks and benefits discussed, surgical consent, monitors and equipment checked, pre-op evaluation and timeout performed   Timeout performed at: 8/20/2024 7:28 AM  Peripheral Block  Patient position: laying flat  Prep: alcohol swabs, ChloraPrep and site prepped and draped  Patient monitoring: heart rate, cardiac monitor and continuous pulse ox  Block type: other (IPACK)  Laterality: right  Injection technique: single-shot  Guidance: nerve stimulator and ultrasound guided  Needle  Needle type: short-bevel   Needle gauge: 21 G  Needle length: 10 cm  Needle localization: anatomical landmarks and ultrasound guidance  Needle insertion depth: 8 cm  Assessment  Injection assessment: negative aspiration for heme, no paresthesia on injection, local visualized surrounding nerve on ultrasound and incremental injection  Paresthesia pain: none  Heart rate change: no  Slow fractionated injection: yes  Additional Notes  Curved transducer, posteromedial approach, IPACK

## 2024-08-20 NOTE — DISCHARGE INSTRUCTIONS
Alexandre Sneed M.D.  Department of Orthopedics  [address] [place] [address]  Office: [phone]    POST OPERATIVE INSTRUCTIONS FOR ACL RECONSTRUCTION    General Anesthesia or Sedation  You have been given medications that affect your balance and coordination for 24 hours.  Go directly home from the hospital and rest for the remainder of the day.  Have a responsible adult stay with you today and overnight.  Do not drive or operate equipment, conduct business or sign any legal documents for the next 24 hours or while taking pain medication.  Do not drink alcohol, take tranquilizers or sleeping pills for the next 24 hours or while taking pain medication.  Deep breathe and cough two times at least every 4 hours today and tomorrow while you are awake. This will help keep fevers away.   Use your CPAP or BiPAP machine whenever sleeping during the day or night.    Diet  Start a light meal and advance to your regular diet as tolerated    Dressing/Wound Care  Keep your dressing clean and dry until seen in the office or by physical therapy  You dressing will be removed at your first post op appointment with the surgical team or with physical therapy.  You may see some spotting or drainage on your dressing, this is normal.  The purpose of the dressing is to apply pressure to the incision and to soak up any discharge or drainage from the incision.  Once the dressing has been removed, inspect the incisions daily for and changes. Some bleeding or light draining may be on the dressing. Bruising around the incisions, the back of the knee and down the shin are normal and will fade away.     Showering/Bathing  Once the dressing has been removed you may shower. Please cover the incisions with water proof band aids or a plastic wrap. Incisions should stay dry until sutures have been removed.   Allow soap and water to gently run over the operative area and pat dry when covered until incisions are fully  healed            Activity and Exercise  Wear your immobilizer or brace until follow up appointment.  Your knee brace is set at a range of motion of 0 degrees. It will be adjusted once seen by the surgical team or physical therapy  Begin  Sports Medicine leg exercises (see instruction sheet) on post op day 1.  Your weight bearing status until your follow up appointment is:  Non-weight bearing - operative extremity may not bear any weight and you must use crutches at all times. Weight bearing status will be adjusted once seen by surgical team or physical therapy.    **Physical Therapy can start 3 - 4 days post op. Please schedule. Your physical order should be in the pre-surgery packet you were sent. If you do not have one, please contact the office.     Ice/Polar Care  Apply an ice pack every 2 hours for 20 - 30 minutes while awake for the first 2 - 3 days.  Then 3 - 5 times a day for 20 minutes until seen by your surgeon.  Never place an ice pack directly on skin.  Always have a barrier such as a towel between the ice pack and your skin.  Your Polar Care unit may be used continuously for the first 2 days postoperatively, then 3 - 5 times daily for 30 minutes until seen by your surgeon.  Refill with ice and water as needed.    Elevation  Elevating your operative extremity will lessen swelling and discomfort.    Support Hose  Wear the support hose sent home with you at all times if you were provided them.  Please take the additional hose with you to the Physical Therapist or Physician office to put on your surgical leg after the dressing is removed.  You may take the hose off to hand wash and air dry, do not place them in the washer or dryer.  You will need to wear the support hose until cleared by your physician.  Wearing compression stockings, using blood thinning medications (typically aspirin), and performing ankle pumps help prevent blood clots!        Medications  Pain medications should be taken with  food.  You may experience dizziness or drowsiness while taking your prescribed pain medication.   DO NOT DRIVE!  DO NOT DRINK ALCOHOL!  Pain medication can be constipating, drink plenty of fluids and increase your fiber intake to avoid this problem.  If you develop constipation, Colace is an over the counter stool softener you may use.  New Take Home Medications - Take as directed on bottle.    Resume your prescription medications as directed by your physician.    Do not take other medications containing Tylenol while on your pain medications.    When To Notify Your Physician  Persistent temperature greater than 101°F.  Increase or severe pain that does not respond to elevation, ice or pain medication.  Unexplained redness, swelling, numbness or tingling that does not improve with elevation or ice.  Increased amount or change in color of drainage.  Persistent nausea and vomiting.  Fingers and toes should remain pink and warm.  Notify your doctor if they become cool, grey or numb.  If you cannot reach your surgeon, seek care at an Urgent Care Center or Emergency Room.      For questions or concerns regarding your care please contact your surgeon      Dr. Alexandre Sneed    [phone]   Jona Guerrero PA-C    [phone]   After hours and weekends   [phone]    Post Operative Appointment:  Please call [facility] at [phone] to schedule your first appointment with Jona Guerrero PA-C in 10-12 days if not already scheduled.    PLEASE NOTE:  Additional information and instruction will be provided by your physician regarding your surgical procedure and continued care at your initial post operative office appointment.                Heel Slide:   If brace is on wait on this. Sitting, pull foot towards body.    Assist stretch with hands. Hold for 5 seconds, then bend a   little bit farther and hold for another 5 seconds then relax.  Repeat 15 times, 6 times per day.           Heel Prop:  Whenever sitting, place heel on a  footrest. Heel must  be high enough so your calf and thigh are off the ground.      Towel/Cord Stretch-Toe pulls:       Sitting, wrap towel or cord around foot.  Pull   With one hand to raise foot.  Stabilize your leg  by placing your other hand on your thigh. Pull  on strap with thigh muscle relaxed for 5 seconds.  Then contract thigh muscle while releasing cord  and hold heel up for 5 seconds. Repeat  sequence 10 times, 6 times per day.      Quad Sets:   Tighten thigh muscle while pushing your knee   down into the towel.  Hold for 5 seconds then rest   for 5 seconds and repeat.  Perform 10 times, 6   times per day.    Straight Leg Raise:          Sit with back supported, or lay down. Tighten front thigh muscle, pull toe   back toward you, then lift leg 8-10   inches off surface. Pause at the top and   lower   slowly to start position. Repeat 15   times, 6 times per day. Sometimes this is easier a week after surgery.       ** Extension Habit ** When rising to stand, shift weight to involved leg and tighten thigh muscle to lock out the knee.  Hold for 10 seconds.    ** Don't forget ankle pumps throughout the day as well!!

## 2024-08-20 NOTE — ANESTHESIA PROCEDURE NOTES
Airway  Date/Time: 8/20/2024 8:02 AM  Urgency: elective      Staffing  Performed: VARINDER   Authorized by: Kendell Emmanuel MD    Performed by: MESSI Kimball  Patient location during procedure: OR    Indications and Patient Condition  Indications for airway management: anesthesia  Preoxygenated: yes  Mask difficulty assessment: 1 - vent by mask    Final Airway Details  Final airway type: supraglottic airway      Successful airway: classic  Size 5     Number of attempts at approach: 1

## 2024-08-20 NOTE — NURSING NOTE
Patient in Phase 2; dressed and up to chair with RN assist. Tolerating po fluids, no complaint of pain and no complaint of nausea.     Friend at bedside; discussed discharge instructions with patient and Friend. All questions at this time answered.     Patient clinically appropriate for discharge. IV removed and patient transported to discharge area via wheelchair.

## 2024-08-20 NOTE — BRIEF OP NOTE
Date: 2024  OR Location: [facility]    Name: Tobi Rosa, : 1990, Age: 33 y.o., MRN: [id number], Sex: male    Diagnosis  Pre-op Diagnosis      * Disruption of anterior cruciate ligament of knee, right, subsequent encounter [S83.533D] Post-op Diagnosis     * Disruption of anterior cruciate ligament of knee, right, subsequent encounter [S83.511D]     * Right knee lateral meniscus tear     Procedures  Right knee arthroscopic ACL reconstruction with patella tendon autograft  Right knee arthroscopic all-inside lateral meniscus repair  Right knee open lateral extra-articular tenodesis with iliotibial band    Surgeons      * Alexandre Sneed - Primary    Resident/Fellow/Other Assistant:  Surgeons and Role:     * Vin Guerrero PA-C - Assisting    Procedure Summary  Anesthesia: Regional, General  ASA: II  Anesthesia Staff: Anesthesiologist: Kendell Emmanuel MD  C-AA: MESSI Kimball  Estimated Blood Loss: 5 mL  Intra-op Medications:   Administrations occurring from 0815 to 1015 on 24:   Medication Name Total Dose   EPINEPHrine (Adrenalin) 1 mg in sodium chloride 0.9 % 3,000 mL irrigation 3,000 mL              Anesthesia Record               Intraprocedure I/O Totals          Intake    ceFAZolin (Ancef) 2 g in dextrose (iso)  mL 100.00 mL    Total Intake 100 mL          Specimen: No specimens collected     Staff:   Circulator: Agatha Maiub Person: Rosibel  Scrub Person: Mynor Poasda Scrub: Foreign    Findings: ACL and lateral meniscus tear    Complications:  None; patient tolerated the procedure well.     Disposition: PACU - hemodynamically stable.  Condition: stable  Specimens Collected: No specimens collected  Attending Attestation: I was present and scrubbed for the entire procedure.    Alexandre Sneed  Phone Number: [phone]

## 2024-08-20 NOTE — PERIOPERATIVE NURSING NOTE
Pt holding normal conversation, VSS. Facial grimacing noted occasionally.   Post op pain control and expectations reviewed with pt. Pt stated understanding.

## 2024-08-20 NOTE — ANESTHESIA PROCEDURE NOTES
Peripheral Block    Patient location during procedure: pre-op  Start time: 8/20/2024 7:31 AM  End time: 8/20/2024 7:33 AM  Reason for block: at surgeon's request and post-op pain management  Staffing  Performed: attending   Authorized by: Kendell Emmanuel MD    Performed by: Kendell Emmanuel MD  Preanesthetic Checklist  Completed: patient identified, IV checked, site marked, risks and benefits discussed, surgical consent, monitors and equipment checked, pre-op evaluation and timeout performed   Timeout performed at: 8/20/2024 7:28 AM  Peripheral Block  Patient position: laying flat  Prep: alcohol swabs, ChloraPrep and site prepped and draped  Patient monitoring: heart rate, cardiac monitor and continuous pulse ox  Block type: adductor canal  Laterality: right  Injection technique: single-shot  Guidance: nerve stimulator and ultrasound guided  Needle  Needle gauge: 21 G  Needle length: 10 cm  Needle localization: ultrasound guidance     image stored in chart  Needle insertion depth: 8 cm  Test dose: negative  Assessment  Injection assessment: negative aspiration for heme, no paresthesia on injection, local visualized surrounding nerve on ultrasound and incremental injection  Paresthesia pain: immediately resolved  Heart rate change: no  Slow fractionated injection: yes  Additional Notes  Dexamethasone 10mg added to local anesthetic.

## 2024-08-21 ENCOUNTER — APPOINTMENT (OUTPATIENT)
Dept: ORTHOPEDIC SURGERY | Facility: HOSPITAL | Age: 34
End: 2024-08-21
Payer: COMMERCIAL

## 2024-08-21 PROBLEM — Z47.89 ORTHOPEDIC AFTERCARE: Status: ACTIVE | Noted: 2024-08-21

## 2024-08-21 PROBLEM — M25.561 ACUTE PAIN OF RIGHT KNEE: Status: ACTIVE | Noted: 2024-08-21

## 2024-08-21 PROBLEM — S83.511A RUPTURE OF ANTERIOR CRUCIATE LIGAMENT OF RIGHT KNEE: Status: ACTIVE | Noted: 2024-08-21

## 2024-08-21 NOTE — PROGRESS NOTES
Physical Therapy  Physical Therapy Orthopedic Evaluation    Patient Name: Tobi Rosa  MRN: [id number]  Today's Date: 8/22/2024         Insurance:  Insurance Type: [employer]; $30 co-pay  Visit number: 1  Visit Limit: 60  Authorization info: NA    General:  Reason for visit: s/p R ACLR w/ BTB on 8/20/24  Referred by: Naren     Precautions:     STEADI Fall Risk Score (The score of 4 or more indicates an increased risk of falling):     Current Problem  1. Acute pain of right knee  Follow Up In Physical Therapy      2. Orthopedic aftercare  Follow Up In Physical Therapy      3. Rupture of anterior cruciate ligament of right knee, initial encounter  Follow Up In Physical Therapy            Subjective:   Subjective   Chief Complaint: R knee pain  Onset: 7/12/24, s/p 8/20/24  INA: fighting    Pt reports to session s/p R ACLR and lateral meniscus repair on 8/20/24 with Dr. Sneed. Pt says that he was MMA fighting when he felt a pop in his knee. Pt was eventually diagnosed with torn ACL and had surgery on 8/20. Pt states that he has been doing okay but is in moderate pain. Locates pain to along incisions and posterior knee. Has been resting at home and not started any exercises yet.    Current Condition:  better    Living will: No  Healthcare POA: No    PAIN  Intensity (0-10): 7/10 current; 9/10 worst  Location: R knee  Description: sharp, dull    Aggravating Factors:  walking, bending, lifting, twisting, stairs, standing   Relieving Factors:  rest    Relevant Information (PMH & Previous Tests/Imaging): xray, MRI  Previous Interventions/Treatments: n/a    Prior Level of Function (PLOF)  Exercise/Physical Activity: independent without difficulty   Work/School: independent without difficulty    Treatment Goals: reduce pain    Red Flags: Do you have any of the following? No  Fever/chills, unexplained weight changes, dizziness/fainting, unexplained change in bowel or bladder functions, unexplained malaise or muscle weakness, night  pain/sweats, numbness or tingling    Objective:  Objective     Observation: no outward signs of infection; sutures intact     Wells DVT Criteria:  Active Cancer (1):  no  Immobility >3 days or major surgery <4 weeks (1): yes  Calf swelling >3 cm compared with other calf (1): no  collateral (non varicose) superficial veins present (1): no  entire leg swollen (1): no  localized tenderness along deep venous system (1): no  pitting edema, greater in symptomatic leg (1): no  paralysis, paresis, or recent plaster immobilization of the lower extremity (1): no  previously documented DVT (1): no  alternative diagnosis to DVT as likely or more likely (-2): no  -2-0: low risk   1-2 moderate risk  >3 high risk     Gait: NWB with B axillary crutches     4 Stage Balance Test: deferred due to WB status     Knee AROM  Flexion  R: 30 deg PROM  L: 145 deg  Extension  R: -5 deg   L: +2 deg      LE MMT:   No strength testing performed secondary to patient being post-op. Will be assessed at follow up visit.      Mtrigger quad sets: 181 mV     Quad tone: POOR  Effusion: 3+     Flexibility  Hamstrings: nt  Quads: nt  Hip Flexors: nt     Palpation: tenderness circumferentially along patella     Accessory Mobility: grossly hypomobile patella in all directions    Circumferential Measurements  Mid Patellar  R: 40 cm  L: 37 cm    Outcome Measures:  LEFS: 5    EDUCATION: home exercise program, plan of care, activity modifications, pain management, and injury pathology       Goals:  Active       PT Problem       PT Goal 1       Start:  08/22/24    Expected End:  11/22/24       1. Pt will demo understanding of and compliance with HEP to progress towards long term goal.     2.  Pt will improve knee extension AROM to at least 0 degrees to improve quality of gait, quad activation.     3. Pt will demo ability to perform 10 SLR without quad lag.     4. Pt will demo decreased swelling as assessed with stroke test to demonstrate improved quadriceps  activation to negotiate stairs.     5. Pt will demo normalized gait pattern with use of assistive device to be able to walk at home.           PT Goal 2       Start:  08/22/24    Expected End:  02/22/25       1. Pt will be independent and compliant with home program in order to safely return to work, recreational activities.      2. Pt will increase LEFS outcome score to >80 pt's to demonstrate improved functional mobility for performance of workplace activities.     3. ROM: full, pain free knee ROM, symmetrical with the uninvolved limb in order to return to normalized walking, running, and going up/down stairs.     4.  Strength: Isokinetic testing 90% or greater for hamstring and quad     5. Effusion: No reactive effusion >1+ with sport-specific activity in order to safely return to running, cutting, change of direction, and sports including softball and basketball.                  Treatments:   See below    Access Code: [email or web address]  URL: [email or web address]  Date: 08/22/2024  Prepared by: Jeffrey Quezada    Exercises  - Supine Calf Stretch with Strap  - 2 x daily - 7 x weekly - 2 sets - 1 minute hold  - Long Sitting Calf Stretch with Strap  - 2 x daily - 7 x weekly - 2 sets - 1 minute hold  - Supine Quad Set  - 2 x daily - 7 x weekly - 2 sets - 1 minute hold  - Supine Gluteal Sets  - 2 x daily - 7 x weekly - 2 sets - 10 reps  - Supine Knee Extension Mobilization with Weight  - 2 x daily - 7 x weekly - as long as tolerable hold  - Supine Ankle Pumps  - 2 x daily - 7 x weekly - 2 sets - 20 reps  - Long Sitting 4 Way Patellar [place]  - 2 x daily - 7 x weekly - 2 sets\  - Ice x 15 min  - wound dressing change  - wound management    Charges: low complexity eval x 1, Therapeutic Exercise x 1, Neuromuscular Re-Education x 1    Assessment: Pt is a 34 y/o Ms/p R ACLR with Dr. Sneed on 8/21/24. Pt displayed lack of knee range of motion, gross lower extremity strength as well as flexibility, pain and  functional ability deficits. Skilled physical therapy is necessary in order to improve aforementioned impairments to allow for increased participation in functional and recreational activities without limitations. Plan of care will consist of lower extremity stretching and strengthening in order to return to PLOF.    Eval Complexity: Low  Clinical Presentation: Stable  Prognosis: Good      Plan: Continue to improve functional mobility, functional strength in order to increase participation in ADLs.    Patient and/or family understands and agrees with goals and plan documented.       Planned Interventions include: therapeutic exercise, self-care home management, manual therapy, therapeutic activities, neuromuscular coordination, vasopneumatic device with cold, blood flow restriction (BFR), dry needling, gait training  Frequency: 1-2x/week  Duration: 6-9 months   Jeffrey Quezada PT

## 2024-08-22 ENCOUNTER — EVALUATION (OUTPATIENT)
Dept: PHYSICAL THERAPY | Facility: CLINIC | Age: 34
End: 2024-08-22
Payer: COMMERCIAL

## 2024-08-22 DIAGNOSIS — M25.561 ACUTE PAIN OF RIGHT KNEE: Primary | ICD-10-CM

## 2024-08-22 DIAGNOSIS — S83.511A DISRUPTION OF ANTERIOR CRUCIATE LIGAMENT OF KNEE, RIGHT, INITIAL ENCOUNTER: ICD-10-CM

## 2024-08-22 DIAGNOSIS — Z47.89 ORTHOPEDIC AFTERCARE: ICD-10-CM

## 2024-08-22 DIAGNOSIS — S83.511A RUPTURE OF ANTERIOR CRUCIATE LIGAMENT OF RIGHT KNEE, INITIAL ENCOUNTER: ICD-10-CM

## 2024-08-22 PROCEDURE — 97110 THERAPEUTIC EXERCISES: CPT | Mod: GP

## 2024-08-22 PROCEDURE — 97161 PT EVAL LOW COMPLEX 20 MIN: CPT | Mod: GP

## 2024-08-22 PROCEDURE — 97112 NEUROMUSCULAR REEDUCATION: CPT | Mod: GP

## 2024-08-22 ASSESSMENT — ENCOUNTER SYMPTOMS
OCCASIONAL FEELINGS OF UNSTEADINESS: 0
LOSS OF SENSATION IN FEET: 0
DEPRESSION: 0

## 2024-08-26 ENCOUNTER — APPOINTMENT (OUTPATIENT)
Dept: ORTHOPEDIC SURGERY | Facility: HOSPITAL | Age: 34
End: 2024-08-26
Payer: COMMERCIAL

## 2024-08-26 NOTE — PROGRESS NOTES
Physical Therapy  Physical Therapy Treatment    Patient Name: Tobi Rosa  MRN: [id number]  Today's Date: 8/27/2024    Time Calculation  Start Time: 0905  Stop Time: 1002  Time Calculation (min): 57 min      Insurance:  Insurance Type: [employer]; $30 co-pay  Visit number: 2  Visit Limit: 60  Authorization info: NA    General:  Reason for visit: s/p R ACLR w/ BTB on 8/20/24  Referred by: Naren     Precautions: refer to surgical protocol    Current Problem  1. Acute pain of right knee  Follow Up In Physical Therapy      2. Orthopedic aftercare  Follow Up In Physical Therapy      3. Rupture of anterior cruciate ligament of right knee, initial encounter  Follow Up In Physical Therapy      4. Lateral meniscus derangement, right            Pain: 0/10    Subjective:   Subjective   Patient reports that he has been doing well. Notes new onset of bruising around knee. Denies any notable incident that may have caused bruising.    HEP Compliance: Good    Objective:   Objective   Observation: no outward signs of infection; sutures intact     Wells DVT Criteria:  Active Cancer (1):  no  Immobility >3 days or major surgery <4 weeks (1): yes  Calf swelling >3 cm compared with other calf (1): no  collateral (non varicose) superficial veins present (1): no  entire leg swollen (1): no  localized tenderness along deep venous system (1): no  pitting edema, greater in symptomatic leg (1): no  paralysis, paresis, or recent plaster immobilization of the lower extremity (1): no  previously documented DVT (1): no  alternative diagnosis to DVT as likely or more likely (-2): no  -2-0: low risk   1-2 moderate risk  >3 high risk     Gait: NWB with B axillary crutches     4 Stage Balance Test: deferred due to WB status     Knee AROM  Flexion  R: 50 deg PROM  L: 145 deg  Extension  R: +2 deg   L: +2 deg      LE MMT:   No strength testing performed secondary to patient being post-op. Will be assessed at follow up visit.      Mtrigger quad sets: 505  from mV 181 mV     Quad tone: poor  Effusion: 3+     Palpation: tenderness circumferentially along patella     Accessory Mobility: grossly hypomobile patella in all directions    Circumferential Measurements  Mid Patellar  R: 40 cm  L: 37 cm      Treatments:       Therapeutic Exercise:                                 wound management  Heel prop  mTrigger quad setting x 5 min  Heel slides  crutch management  HEP Printout/Review  Ice x 15 min    Manual Therapy:                                        PROM: flexion, extension  Patellar mobs      Neuromuscular Re-Education:                   NMES 48 rudolph, 10:10, quad sets     Equipment Provided:   HEP Provided:  Access Code: [email or web address]  URL: [email or web address]  Date: 08/27/2024  Prepared by: Jeffrey Quezada    Exercises  - Supine Calf Stretch with Strap  - 2 x daily - 7 x weekly - 2 sets - 1 minute hold  - Long Sitting Calf Stretch with Strap  - 2 x daily - 7 x weekly - 2 sets - 1 minute hold  - Supine Quad Set  - 2 x daily - 7 x weekly - 2 sets - 1 minute hold  - Supine Gluteal Sets  - 2 x daily - 7 x weekly - 2 sets - 10 reps  - Supine Knee Extension Mobilization with Weight  - 2 x daily - 7 x weekly - as long as tolerable hold  - Supine Ankle Pumps  - 2 x daily - 7 x weekly - 2 sets - 20 reps  - Long Sitting 4 Way Patellar [place]  - 2 x daily - 7 x weekly - 2 sets  - Supine Heel Slide with Strap  - 2 x daily - 7 x weekly - 2 sets - 10 reps    Charges: Neuromuscular Re-Education x 1, Manual Therapy x 1, Therapeutic Exercise x 1    Assessment: The focus of the session was Strengthening, ROM, Stretching, joint mobilizations, Motor Control, and Effusion management. The pt demonstrated Good tolerance to the noted exercises today. The pt is demonstrated Good progress in skilled rehab at this time. The pt is still limited in overall Strength, ROM, Flexibility, Motor control, Balance, Gait mechanics, Effusion, and Pain at this time. The pt continues to  be a good candidate for skilled PT, in order to further improve Strength, ROM, Flexibility, Motor control, Balance, Gait mechanics, Effusion, and Pain.          Plan: Continue to progress knee range of motion, strength as able. Extension stretching, heel slides.       Jeffrey Quezada, PT

## 2024-08-27 ENCOUNTER — TREATMENT (OUTPATIENT)
Dept: PHYSICAL THERAPY | Facility: CLINIC | Age: 34
End: 2024-08-27
Payer: COMMERCIAL

## 2024-08-27 DIAGNOSIS — M25.561 ACUTE PAIN OF RIGHT KNEE: Primary | ICD-10-CM

## 2024-08-27 DIAGNOSIS — Z47.89 ORTHOPEDIC AFTERCARE: ICD-10-CM

## 2024-08-27 DIAGNOSIS — M23.300 LATERAL MENISCUS DERANGEMENT, RIGHT: ICD-10-CM

## 2024-08-27 DIAGNOSIS — S83.511A RUPTURE OF ANTERIOR CRUCIATE LIGAMENT OF RIGHT KNEE, INITIAL ENCOUNTER: ICD-10-CM

## 2024-08-27 PROCEDURE — 97140 MANUAL THERAPY 1/> REGIONS: CPT | Mod: GP

## 2024-08-27 PROCEDURE — 97110 THERAPEUTIC EXERCISES: CPT | Mod: GP

## 2024-08-27 PROCEDURE — 97112 NEUROMUSCULAR REEDUCATION: CPT | Mod: GP

## 2024-08-29 ENCOUNTER — TREATMENT (OUTPATIENT)
Dept: PHYSICAL THERAPY | Facility: CLINIC | Age: 34
End: 2024-08-29
Payer: COMMERCIAL

## 2024-08-29 DIAGNOSIS — M25.561 ACUTE PAIN OF RIGHT KNEE: Primary | ICD-10-CM

## 2024-08-29 DIAGNOSIS — Z47.89 ORTHOPEDIC AFTERCARE: ICD-10-CM

## 2024-08-29 DIAGNOSIS — S83.511A RUPTURE OF ANTERIOR CRUCIATE LIGAMENT OF RIGHT KNEE, INITIAL ENCOUNTER: ICD-10-CM

## 2024-08-29 PROCEDURE — 97140 MANUAL THERAPY 1/> REGIONS: CPT | Mod: GP

## 2024-08-29 PROCEDURE — 97110 THERAPEUTIC EXERCISES: CPT | Mod: GP

## 2024-08-29 PROCEDURE — 97112 NEUROMUSCULAR REEDUCATION: CPT | Mod: GP

## 2024-08-29 NOTE — PROGRESS NOTES
Physical Therapy  Physical Therapy Treatment    Patient Name: Tobi Rosa  MRN: [id number]  Today's Date: 8/29/2024    Time Calculation  Start Time: 0957  Stop Time: 1052  Time Calculation (min): 55 min      Insurance:  Insurance Type: [employer]; $30 co-pay  Visit number: 3  Visit Limit: 60  Authorization info: NA    General:  Reason for visit: s/p R ACLR w/ BTB on 8/20/24  Referred by: Naren     Precautions: refer to surgical protocol    Current Problem  1. Acute pain of right knee  Follow Up In Physical Therapy      2. Orthopedic aftercare  Follow Up In Physical Therapy      3. Rupture of anterior cruciate ligament of right knee, initial encounter  Follow Up In Physical Therapy          Pain: 0/10    Subjective:   Subjective   Patient reports that he has been doing okay. Notes sudden sharp pain along posterior leg when turning in bed. Denies any DVT symptoms.     HEP Compliance: Fair    Objective:   Objective     Observation: no outward signs of infection; sutures intact     Wells DVT Criteria:  Active Cancer (1):  no  Immobility >3 days or major surgery <4 weeks (1): yes  Calf swelling >3 cm compared with other calf (1): no  collateral (non varicose) superficial veins present (1): no  entire leg swollen (1): no  localized tenderness along deep venous system (1): no  pitting edema, greater in symptomatic leg (1): no  paralysis, paresis, or recent plaster immobilization of the lower extremity (1): no  previously documented DVT (1): no  alternative diagnosis to DVT as likely or more likely (-2): no  -2-0: low risk   1-2 moderate risk  >3 high risk     Gait: NWB with B axillary crutches     4 Stage Balance Test: deferred due to WB status     Knee AROM  Flexion  R: 70 deg PROM; n/t actively   L: 145 deg  Extension  R: +2 deg   L: +2 deg      LE MMT:   No strength testing performed secondary to patient being post-op. Will be assessed at follow up visit.      Mtrigger quad sets: 580 from mV 181 mV    SLR: independent with  no lag     Quad tone: improving  Effusion: 3+     Palpation: tenderness circumferentially along patella     Accessory Mobility: grossly hypomobile patella in all directions    Circumferential Measurements  Mid Patellar  R: 40 cm  L: 37 cm    Treatments:     Therapeutic Exercise:                                 wound management  Supine calf stretch w/ strap 2'  Long sitting HS stretch w/ strap 2'  Heel prop w/ 4# ankle weight  mTrigger quad setting x 5 min  HEP Review  Ice x 15 min    Manual Therapy:                                        PROM: flexion, extension  Patellar mobs      Neuromuscular Re-Education:                   NMES 48 rudolph, 10:10, quad sets, SLR    Equipment Provided:   HEP Provided:  Access Code: [email or web address]      Charges: Therapeutic Exercise x 1, Manual Therapy x 1, Neuromuscular Re-Education x 1     Assessment: Pt's knee extension continues to be full compared to contralateral knee. Pt's passive knee flexion continues to improve weekly. Treatment continued to focus on improving posterior flexibility and anterior strength for improved range of motion and gait pattern. Continued with NMES to improve quadriceps facilitation. Pt is able to perform SLR independently without lag and included in HEP.       Plan: Continue with PROM, AAROM for range of motion. Quad strengthening for knee extension.       Jeffrey Quezada, PT

## 2024-09-03 ENCOUNTER — TREATMENT (OUTPATIENT)
Dept: PHYSICAL THERAPY | Facility: CLINIC | Age: 34
End: 2024-09-03
Payer: COMMERCIAL

## 2024-09-03 ENCOUNTER — OFFICE VISIT (OUTPATIENT)
Dept: ORTHOPEDIC SURGERY | Facility: HOSPITAL | Age: 34
End: 2024-09-03
Payer: COMMERCIAL

## 2024-09-03 VITALS — BODY MASS INDEX: 30.06 KG/M2 | WEIGHT: 210 LBS | HEIGHT: 70 IN

## 2024-09-03 DIAGNOSIS — S83.511D DISRUPTION OF ANTERIOR CRUCIATE LIGAMENT OF KNEE, RIGHT, SUBSEQUENT ENCOUNTER: Primary | ICD-10-CM

## 2024-09-03 DIAGNOSIS — Z47.89 ORTHOPEDIC AFTERCARE: ICD-10-CM

## 2024-09-03 DIAGNOSIS — M25.561 ACUTE PAIN OF RIGHT KNEE: Primary | ICD-10-CM

## 2024-09-03 DIAGNOSIS — S83.511A RUPTURE OF ANTERIOR CRUCIATE LIGAMENT OF RIGHT KNEE, INITIAL ENCOUNTER: ICD-10-CM

## 2024-09-03 DIAGNOSIS — S83.282D ACUTE LATERAL MENISCUS TEAR OF LEFT KNEE, SUBSEQUENT ENCOUNTER: ICD-10-CM

## 2024-09-03 PROCEDURE — 97110 THERAPEUTIC EXERCISES: CPT | Mod: GP

## 2024-09-03 PROCEDURE — 97112 NEUROMUSCULAR REEDUCATION: CPT | Mod: GP

## 2024-09-03 PROCEDURE — 99211 OFF/OP EST MAY X REQ PHY/QHP: CPT | Performed by: SPECIALIST/TECHNOLOGIST

## 2024-09-03 PROCEDURE — 3008F BODY MASS INDEX DOCD: CPT | Performed by: SPECIALIST/TECHNOLOGIST

## 2024-09-03 PROCEDURE — 97140 MANUAL THERAPY 1/> REGIONS: CPT | Mod: GP

## 2024-09-03 ASSESSMENT — PAIN SCALES - GENERAL: PAINLEVEL_OUTOF10: 2

## 2024-09-03 ASSESSMENT — PAIN - FUNCTIONAL ASSESSMENT: PAIN_FUNCTIONAL_ASSESSMENT: 0-10

## 2024-09-03 NOTE — PROGRESS NOTES
Physical Therapy  Physical Therapy Treatment    Patient Name: Tobi Rosa  MRN: [id number]  Today's Date: 9/3/2024    Time Calculation  Start Time: 1115  Stop Time: 1212  Time Calculation (min): 57 min  Insurance:  Insurance Type: [employer]; $30 co-pay  Visit number: 4  Visit Limit: 60  Authorization info: NA    General:  Reason for visit: s/p R ACLR w/ BTB on 8/20/24  Referred by: Naren     Precautions: refer to surgical protocol    Current Problem  1. Acute pain of right knee  Follow Up In Physical Therapy      2. Orthopedic aftercare  Follow Up In Physical Therapy      3. Rupture of anterior cruciate ligament of right knee, initial encounter  Follow Up In Physical Therapy          Pain: 0/10    Subjective:   Subjective   Patient reports that he has been doing well. States that he has been doing his SLR at home without brace.    HEP Compliance: Good    Objective:   Objective     Observation: no outward signs of infection; sutures intact     Wells DVT Criteria:  Active Cancer (1):  no  Immobility >3 days or major surgery <4 weeks (1): yes  Calf swelling >3 cm compared with other calf (1): no  collateral (non varicose) superficial veins present (1): no  entire leg swollen (1): no  localized tenderness along deep venous system (1): no  pitting edema, greater in symptomatic leg (1): no  paralysis, paresis, or recent plaster immobilization of the lower extremity (1): no  previously documented DVT (1): no  alternative diagnosis to DVT as likely or more likely (-2): no  -2-0: low risk   1-2 moderate risk  >3 high risk     Gait: NWB with B axillary crutches     4 Stage Balance Test: deferred due to WB status     Knee AROM  Flexion  R: 60 deg PROM; n/t actively   L: 145 deg  Extension  R: +2 deg   L: +2 deg      LE MMT:   No strength testing performed secondary to patient being post-op. Will be assessed at follow up visit.      Mtrigger quad sets: 580 from mV from 181 mV    SLR: independent with no lag     Quad tone:  improving  Effusion: 3+     Palpation: tenderness circumferentially along patella     Accessory Mobility: grossly hypomobile patella in all directions    Circumferential Measurements  Mid Patellar  R: 40 cm  L: 37 cm      Treatments:     Therapeutic Exercise:                                 wound management  Supine calf stretch w/ strap 2'  Long sitting HS stretch w/ strap 2'  Long sitting HS stretch w/ strap and manual overpressure 2'  Quad sets w/ calf stretch w/ strap 2 x 10 w/ 5 sec holds  mTrigger quad setting x 5 min (nt)  HEP Review  Ice x 15 min    Manual Therapy:                                        PROM: flexion, extension  Patellar mobs    Neuromuscular Re-Education:                   NMES 55 rudolph, 10:10, quad sets, side SLR    Equipment Provided:   HEP Provided:  Access Code: [email or web address]    Charges: Therapeutic Exercise x 1, Manual Therapy x 1 Neuromuscular Re-Education x 1    Assessment: Pt's knee extension continues to be full and passive knee flexion is gradually improving. Continued with NMES to improve quadriceps facilitation and strength for knee extension ROM and future normal gait pattern. Manual therapy also utilized to improve soft tissue mobility restrictions. Pt had difficulty with independent SLR during session due to anterior shin discomfort. Pt has follow up with MD later today and will adjust plan of care accordingly.        Plan: Continue to progress non-weight bearing exercises to improve knee flexion, extension range of motion and quad strength. Include 50% weight bearing exercises upon clearance from MD.       Jeffrey Quezada, PT

## 2024-09-04 NOTE — PROGRESS NOTES
Subjective    Patient ID: Tobi Rosa is a 33 y.o. male.    Procedure: Right knee ACL reconstruction with patellar tendon autograft, lateral meniscus repair and IT band tenodesis  Date of surgery: 8/20/2024    HPI:  Tobi Rosa is a 33 y.o. male presenting today 2 weeks status post right knee anterior cruciate ligament reconstruction with patellar tendon autograft, lateral meniscus repair and IT band tenodesis.  Overall he is doing well. Patient reports no adverse events. Pain is well-controlled. He is not using aspirin and MAYRA hose for DVT prophylaxis.  He said the pharmacy did not have the aspirin prescription when he went to pick it up.  He has been compliant with use of the brace and weightbearing restrictions. Fever, chills, shortness of breath, or cough are denied. Physical therapy has started without difficulty. He presents for continued treatment recommendations.     ROS:  Constitutional: No fever, no chills, not feeling tired, no recent weight gain and no recent weight loss  ENT: No nosebleeds  Cardiovascular: No chest pain  Respiratory: No shortness of breath and no cough  Gastrointestinal: No abdominal pain, no nausea, no diarrhea, and no vomiting  Musculoskeletal: No arthralgias  Integumentary: No rashes and no skin lesions  Neurological: No headache  Psychiatric: No sleep disturbances no depression  Endocrine: No muscle weakness and no muscle cramps  Hematologic/lymphatic: No swelling glands and no tendency for easy bruising    Past Medical History:   Diagnosis Date    Asthma ([facility])     Stomach ulcer         Past Surgical History:   Procedure Laterality Date    FINGER SURGERY Right     OTHER SURGICAL HISTORY  02/02/2021    No history of surgery          Current Outpatient Medications:     aspirin 325 mg EC tablet, Take 1 tablet (325 mg) by mouth once daily. Do not fill before August 21, 2024., Disp: 15 tablet, Rfl: 0    docusate sodium (Colace) 100 mg capsule, Take 1 capsule (100 mg) by mouth 2  times a day for 15 days., Disp: 30 capsule, Rfl: 0    multivitamin tablet, Take 1 tablet by mouth once daily., Disp: , Rfl:     ondansetron (Zofran) 4 mg tablet, Take 1 tablet (4 mg) by mouth every 8 hours if needed for nausea or vomiting., Disp: 20 tablet, Rfl: 0    oxyCODONE-acetaminophen (Percocet) 5-325 mg tablet, Take 1 tablet by mouth every 6 hours if needed for severe pain (7 - 10)., Disp: 20 tablet, Rfl: 0     No Known Allergies     Social Connections: Not on file        PHYSICAL EXAM:  33 y.o. male well appearing in no acute distress. Alert and oriented ×3.  Skin intact bilateral lower extremities.   Using crutches. Coordination and balance intact.  Bilateral lower extremity compartments supple.  5 out of 5 distal motor strength bilaterally.  L4 through S1 sensation intact bilaterally.  2+ DP/PT pulses bilaterally.  Right knee incisions are clean, dry and intact without signs of infection. No erythema or drainage. Sutures were removed and Steri-Strips applied. There is mild quad atrophy. Patient can perform an isometric quad contraction and straight leg raise without difficulty or lag. Active range of motion from 0-90 degrees. Negative Lachman's. Negative Homans.  Small effusion.    ASSESSMENT/PLAN:  Status post 2 weeks right knee anterior cruciate ligament reconstruction with patella tendon autograft, lateral meniscus repair and IT band tenodesis.     1. Incisions may get wet but avoid submersing them in water.  2. Continue frequent icing and elevation.  3. Continue outpatient physical therapy and home exercise program, per the protocol.  He should avoid knee flexion past 90 degrees secondary to his lateral meniscus repair.  4. Appropriate activity and restrictions were discussed.  He should continue with 50% weightbearing for the next 2 weeks to allow optimal healing conditions for his meniscus.  5. Over-the-counter analgesia as may be used as necessary.  6. Follow up again in 2 weeks.    **This office  note was dictated using Dragon voice to text software and was not proofread for spelling or grammatical errors

## 2024-09-05 ENCOUNTER — TREATMENT (OUTPATIENT)
Dept: PHYSICAL THERAPY | Facility: CLINIC | Age: 34
End: 2024-09-05
Payer: COMMERCIAL

## 2024-09-05 ENCOUNTER — APPOINTMENT (OUTPATIENT)
Dept: PHYSICAL THERAPY | Facility: CLINIC | Age: 34
End: 2024-09-05
Payer: COMMERCIAL

## 2024-09-05 DIAGNOSIS — Z47.89 ORTHOPEDIC AFTERCARE: ICD-10-CM

## 2024-09-05 DIAGNOSIS — M25.561 ACUTE PAIN OF RIGHT KNEE: Primary | ICD-10-CM

## 2024-09-05 DIAGNOSIS — S83.511A RUPTURE OF ANTERIOR CRUCIATE LIGAMENT OF RIGHT KNEE, INITIAL ENCOUNTER: ICD-10-CM

## 2024-09-05 PROCEDURE — 97140 MANUAL THERAPY 1/> REGIONS: CPT | Mod: GP

## 2024-09-05 PROCEDURE — 97110 THERAPEUTIC EXERCISES: CPT | Mod: GP

## 2024-09-05 NOTE — PROGRESS NOTES
Physical Therapy  Physical Therapy Treatment    Patient Name: Tobi Rosa  MRN: [id number]  Today's Date: 9/5/2024    Time Calculation  Start Time: 1035  Stop Time: 1131  Time Calculation (min): 56 min    Insurance:  Insurance Type: [employer]; $30 co-pay  Visit number: 5  Visit Limit: 60  Authorization info: NA    General:  Reason for visit: s/p R ACLR w/ BTB on 8/20/24  Referred by: Naren     Precautions: refer to surgical protocol    Current Problem  1. Acute pain of right knee  Follow Up In Physical Therapy      2. Orthopedic aftercare  Follow Up In Physical Therapy      3. Rupture of anterior cruciate ligament of right knee, initial encounter  Follow Up In Physical Therapy          Pain: 0/10    Subjective:   Subjective   Patient reports that he has been doing fine since Tuesday. Met with MD who is pleased with range of motion, strength progress thus far. MD allowed for 50% weight bearing as well as unlocked brace to 45 degrees.     HEP Compliance: Good    Objective:   Objective     Observation: no outward signs of infection; steri strips intact     Robert DVT Criteria:  Active Cancer (1):  no  Immobility >3 days or major surgery <4 weeks (1): yes  Calf swelling >3 cm compared with other calf (1): no  collateral (non varicose) superficial veins present (1): no  entire leg swollen (1): no  localized tenderness along deep venous system (1): no  pitting edema, greater in symptomatic leg (1): no  paralysis, paresis, or recent plaster immobilization of the lower extremity (1): no  previously documented DVT (1): no  alternative diagnosis to DVT as likely or more likely (-2): no  -2-0: low risk   1-2 moderate risk  >3 high risk     Gait: 50% WB with axillary crutch and unlocked brace to 45 degrees     Knee AROM  Flexion  R: 65 deg; PROM: 80 deg   L: 145 deg  Extension  R: +2 deg   L: +2 deg      LE MMT:   No strength testing performed secondary to patient being post-op. Will be assessed at follow up visit.      Sirnea  quad sets: 937 mV from 580 from mV     Neuromuscular Deficit Test: 68%    SLR: independent with no lag     Quad tone: improving  Effusion: 2+     Palpation: tenderness circumferentially along patella     Accessory Mobility: grossly hypomobile patella in all directions    Circumferential Measurements  Mid Patellar  R: 40 cm  L: 37 cm    Treatments:     Therapeutic Exercise:                                 wound management  SLR w/ 75% LOP x 30, 15, 15, 15   Side SLR w/ 75% LOP x 30, 15, 15, 15   Standing TKEs w/ 50% WB, 75% LOP x 30, 15, 15, 15   mTrigger quad setting x 3 min   HEP Review  Ice x 10 min    Manual Therapy:                                        PROM: flexion, extension  Patellar mobs    Neuromuscular Re-Education:                   NMES 55 rudolph, 10:10, quad sets, side SLR (nt)    Equipment Provided:   HEP Provided:  Access Code: [email or web address]    Charges: Therapeutic Exercise x 2, Manual Therapy x 1    Assessment: Pt is ~2.5 weeks post-op and doing well. Continues to have full knee extension range of motion as well as bending to 90 degree restriction currently. Introduced blood flow restriction training to continue to improve quadriceps strength for improved ambulation and functional ability. Also introduce standing exercise within weight bearing precautions. Pt continues to progress well and remains great candidate for skilled therapy at this time.       Plan: Continue to improve quadriceps strength, knee flexion, extension range of motion. Initiate 50% weight bearing exercises.       Jeffrey Quezada, PT

## 2024-09-10 ENCOUNTER — TREATMENT (OUTPATIENT)
Dept: PHYSICAL THERAPY | Facility: CLINIC | Age: 34
End: 2024-09-10
Payer: COMMERCIAL

## 2024-09-10 DIAGNOSIS — S83.511A RUPTURE OF ANTERIOR CRUCIATE LIGAMENT OF RIGHT KNEE, INITIAL ENCOUNTER: ICD-10-CM

## 2024-09-10 DIAGNOSIS — M25.561 ACUTE PAIN OF RIGHT KNEE: Primary | ICD-10-CM

## 2024-09-10 DIAGNOSIS — Z47.89 ORTHOPEDIC AFTERCARE: ICD-10-CM

## 2024-09-10 PROCEDURE — 97016 VASOPNEUMATIC DEVICE THERAPY: CPT | Mod: GP,CQ

## 2024-09-10 PROCEDURE — 97140 MANUAL THERAPY 1/> REGIONS: CPT | Mod: GP,CQ

## 2024-09-10 PROCEDURE — 97110 THERAPEUTIC EXERCISES: CPT | Mod: GP,CQ

## 2024-09-10 NOTE — PROGRESS NOTES
Physical Therapy  Physical Therapy Treatment    Patient Name: Tobi Rosa  MRN: [id number]  Today's Date: 9/10/2024  Time Calculation  Start Time: 1507  Stop Time: 1557  Time Calculation (min): 50 min    Insurance:  Insurance Type: [employer]; $30 co-pay  Visit number: 6  Visit Limit: 60  Authorization info: NA    General:  Reason for visit: s/p R ACLR w/ BTB on 8/20/24  Referred by: Naren     Precautions: refer to surgical protocol    Current Problem  1. Acute pain of right knee  Follow Up In Physical Therapy      2. Orthopedic aftercare  Follow Up In Physical Therapy      3. Rupture of anterior cruciate ligament of right knee, initial encounter  Follow Up In Physical Therapy        Pain: 1/10    Subjective:   Subjective   Patient reports that they are not necessarily having any pain more just an annoyance. Pt reports still having difficulty with sleep by having brace on.     HEP Compliance: Good    Objective:   Objective     Knee AROM  Flexion  R: 65 deg; PROM: 82 deg   L: 145 deg  Extension  R: +2 deg   L: +2 deg        Treatments:  Therapeutic Exercise:                                 NuStep 5' lvl 3   Standing SLR LOP x 30, 15, 15, 15,   Standing hip abduction LOP x 30, 15, 15, 15,   Standing hip extension LOP x 30, 15, 15, 15,   Ice x 10 min     Manual Therapy:                                        PROM: flexion, extension  Patellar mobs    Neuromuscular Re-Education:                   NMES 55 rudolph, 10:10, quad sets, side SLR (nt)    Equipment Provided:   HEP Provided:  Access Code: [email or web address]    Charges: Therapeutic Exercise x 2, Manual Therapy x 1    Assessment:   Pt tolerated treatment session well and did well with newly added exercises to BFR. Pt reported no increase in pain during TE and manual performed in today's session.     Plan:   Continue to perform 50% WB exercises in next sessions.        Lane Cohen, PTA

## 2024-09-12 ENCOUNTER — APPOINTMENT (OUTPATIENT)
Dept: PHYSICAL THERAPY | Facility: CLINIC | Age: 34
End: 2024-09-12
Payer: COMMERCIAL

## 2024-09-13 ENCOUNTER — TREATMENT (OUTPATIENT)
Dept: PHYSICAL THERAPY | Facility: CLINIC | Age: 34
End: 2024-09-13
Payer: COMMERCIAL

## 2024-09-13 DIAGNOSIS — S83.511A RUPTURE OF ANTERIOR CRUCIATE LIGAMENT OF RIGHT KNEE, INITIAL ENCOUNTER: ICD-10-CM

## 2024-09-13 DIAGNOSIS — M25.561 ACUTE PAIN OF RIGHT KNEE: ICD-10-CM

## 2024-09-13 DIAGNOSIS — Z47.89 ORTHOPEDIC AFTERCARE: ICD-10-CM

## 2024-09-13 PROCEDURE — 97110 THERAPEUTIC EXERCISES: CPT | Mod: GP,CQ

## 2024-09-13 NOTE — PROGRESS NOTES
Physical Therapy  Physical Therapy Treatment    Patient Name: Tobi Rosa  MRN: [id number]  Today's Date: 9/13/2024  Time Calculation  Start Time: 0915  Stop Time: 0955  Time Calculation (min): 40 min    Insurance:  Insurance Type: [employer]; $30 co-pay  Visit number: 7  Visit Limit: 60  Authorization info: NA    General:  Reason for visit: s/p R ACLR w/ BTB on 8/20/24  Referred by: Naren     Precautions: refer to surgical protocol    Current Problem  1. Acute pain of right knee  Follow Up In Physical Therapy      2. Orthopedic aftercare  Follow Up In Physical Therapy      3. Rupture of anterior cruciate ligament of right knee, initial encounter  Follow Up In Physical Therapy        Pain: 1/10    Subjective:   Subjective   Patient reports minor numbness on RLE lateral knee but only in a small area. Pt was curious about blood clot symptoms and was informed about them.     HEP Compliance: Good    Objective:   Objective     Knee AROM  Flexion  R: 65 deg; PROM: 79 deg !p  L: 145 deg  Extension  R: +2 deg   L: +2 deg        Treatments:  Therapeutic Exercise:                                 NuStep 5' lvl 3   Standing TKE 30, 15, 15, 15  Gastroc stretch on incline board 1'   Standing hip abduction LOP x 30, 15, 15, 15,   Standing SLR LOP x 30, 15, 15, 15,   PROM 5'   Ice x 15 min   Manual Therapy:                                        PROM: flexion, extension  Patellar mobs    Neuromuscular Re-Education:                   NMES 55 rudolph, 10:10, quad sets, side SLR (nt)    Equipment Provided:   HEP Provided:  Access Code: [email or web address]    Charges: Therapeutic Exercise x 2, Manual Therapy x 1    Assessment:   Pt tolerated treatment session well today requiring cueing to maintain toes pointed forward during hip abduction. Pt had greatest difficult with TKE's in today's session and minor increase of pain at end range knee flexion.   Plan:   Continue to perform 50% WB exercises in next sessions.  Attempt a mix of hip extension/abduction  in next visit. Continue per POC.        Lane Cohen, MARTIN

## 2024-09-17 ENCOUNTER — TREATMENT (OUTPATIENT)
Dept: PHYSICAL THERAPY | Facility: CLINIC | Age: 34
End: 2024-09-17
Payer: COMMERCIAL

## 2024-09-17 ENCOUNTER — APPOINTMENT (OUTPATIENT)
Dept: PHYSICAL THERAPY | Facility: CLINIC | Age: 34
End: 2024-09-17
Payer: COMMERCIAL

## 2024-09-17 DIAGNOSIS — Z47.89 ORTHOPEDIC AFTERCARE: ICD-10-CM

## 2024-09-17 DIAGNOSIS — M25.561 ACUTE PAIN OF RIGHT KNEE: ICD-10-CM

## 2024-09-17 DIAGNOSIS — S83.511A RUPTURE OF ANTERIOR CRUCIATE LIGAMENT OF RIGHT KNEE, INITIAL ENCOUNTER: ICD-10-CM

## 2024-09-17 PROCEDURE — 97110 THERAPEUTIC EXERCISES: CPT | Mod: GP

## 2024-09-17 PROCEDURE — 97140 MANUAL THERAPY 1/> REGIONS: CPT | Mod: GP

## 2024-09-17 NOTE — PROGRESS NOTES
Physical Therapy  Physical Therapy Treatment    Patient Name: Tobi Rosa  MRN: [id number]  Today's Date: 9/17/2024    Time Calculation  Start Time: 0945  Stop Time: 1036  Time Calculation (min): 51 min    Insurance:  Insurance Type: [employer]; $30 co-pay  Visit number: 8  Visit Limit: 60  Authorization info: NA    General:  Reason for visit: s/p R ACLR w/ BTB on 8/20/24  Referred by: Naren     Precautions: refer to surgical protocol      Current Problem  1. Acute pain of right knee  Follow Up In Physical Therapy      2. Orthopedic aftercare  Follow Up In Physical Therapy      3. Rupture of anterior cruciate ligament of right knee, initial encounter  Follow Up In Physical Therapy          Pain: 0/10    Subjective:   Subjective   Patient reports that he has been doing okay since last session.    HEP Compliance: Good    Objective:   Objective     Observation: no outward signs of infection; steri strips intact     Robert DVT Criteria:  Active Cancer (1):  no  Immobility >3 days or major surgery <4 weeks (1): yes  Calf swelling >3 cm compared with other calf (1): no  collateral (non varicose) superficial veins present (1): no  entire leg swollen (1): no  localized tenderness along deep venous system (1): no  pitting edema, greater in symptomatic leg (1): no  paralysis, paresis, or recent plaster immobilization of the lower extremity (1): no  previously documented DVT (1): no  alternative diagnosis to DVT as likely or more likely (-2): no  -2-0: low risk   1-2 moderate risk  >3 high risk     Gait: 50% WB with axillary crutch and unlocked brace to 45 degrees     Knee AROM  Flexion  R: 90 deg; PROM: 80 deg   L: 145 deg  Extension  R: +2 deg   L: +2 deg      LE MMT:   No strength testing performed secondary to patient being post-op. Will be assessed at follow up visit.      Mtrigger quad sets: 1289 mV from 937 mV from 580 from mV     Neuromuscular Deficit Test: 27% from 68%    SLR: independent with no lag     Quad tone:  improving  Effusion: 1+     Palpation: tenderness circumferentially along patella     Accessory Mobility: grossly hypomobile patella in all directions    Circumferential Measurements  Mid Patellar  R: 40 cm  L: 38 cm    Treatments:     Therapeutic Exercise:                                 Nu step x 6'  Supine knee extension stretch w/ 10#   Supine calf stretch w/ strap x 2'  Long sitting HS stretch w/ strap x 2'  mTrigger quad setting x 5 min   Sit to stands w/ 15# db 2 x 20  6 inch step ups w/ UE support 3 x 10  HEP Review  Ice x 10 min    Manual Therapy:                                        PROM: flexion, extension  Patellar mobs    Neuromuscular Re-Education:                   NMES 55 rudolph, 10:10, quad sets, side SLR (nt)    Equipment Provided:   HEP Provided:  Access Code: [email or web address]      Charges: Therapeutic Exercise x 2,  Manual Therapy x 1    Assessment: Pt is 4 weeks post-op today. Progressed pt to single point cane with WBAT and education on proper gait pattern. Pt's quadriceps recruitment has significantly improved since last session. Pt's neuromuscular deficit test has been reduced to ~25%. Initiated light closed chain exercises within tolerance. Pt tolerated session without complaints.        Plan: Adjust plan of care as necessary per follow up with MD tomorrow.       Jeffrey Quezada, PT

## 2024-09-18 ENCOUNTER — OFFICE VISIT (OUTPATIENT)
Dept: ORTHOPEDIC SURGERY | Facility: HOSPITAL | Age: 34
End: 2024-09-18
Payer: COMMERCIAL

## 2024-09-18 VITALS — WEIGHT: 210 LBS | BODY MASS INDEX: 30.06 KG/M2 | HEIGHT: 70 IN

## 2024-09-18 DIAGNOSIS — S83.511D DISRUPTION OF ANTERIOR CRUCIATE LIGAMENT OF KNEE, RIGHT, SUBSEQUENT ENCOUNTER: Primary | ICD-10-CM

## 2024-09-18 PROCEDURE — 3008F BODY MASS INDEX DOCD: CPT | Performed by: SPECIALIST/TECHNOLOGIST

## 2024-09-18 PROCEDURE — 99211 OFF/OP EST MAY X REQ PHY/QHP: CPT | Performed by: SPECIALIST/TECHNOLOGIST

## 2024-09-18 ASSESSMENT — PAIN - FUNCTIONAL ASSESSMENT: PAIN_FUNCTIONAL_ASSESSMENT: 0-10

## 2024-09-18 ASSESSMENT — PAIN SCALES - GENERAL: PAINLEVEL_OUTOF10: 0 - NO PAIN

## 2024-09-18 ASSESSMENT — PAIN DESCRIPTION - DESCRIPTORS: DESCRIPTORS: DISCOMFORT;SORE

## 2024-09-18 NOTE — PROGRESS NOTES
Procedure: Right knee ACL reconstruction with patellar tendon autograft, lateral meniscus repair and IT band tenodesis  Date of procedure: 8/20/2024    HPI:   Patient is status post 4 weeks right knee anterior cruciate ligament reconstruction with patella tendon autograft and lateral meniscus repair and IT band tenodesis.  Overall, he is doing well.  He denies any problems or issues since his last visit.  He continues with formal physical therapy and is progressing quite nicely.  He denies instability.  He continues with a cane and brace.  He presents for treatment recommendations.     ROS:  Constitutional: No fever, no chills, not feeling tired, no recent weight gain and no recent weight loss  ENT: No nosebleeds  Cardiovascular: No chest pain  Respiratory: No shortness of breath and no cough  Gastrointestinal: No abdominal pain, no nausea, no diarrhea, and no vomiting  Musculoskeletal: No arthralgias  Integumentary: No rashes and no skin lesions  Neurological: No headache  Psychiatric: No sleep disturbances no depression  Endocrine: No muscle weakness and no muscle cramps  Hematologic/lymphatic: No swelling glands and no tendency for easy bruising    Past Medical History:   Diagnosis Date    Asthma ([facility])     Stomach ulcer         Past Surgical History:   Procedure Laterality Date    FINGER SURGERY Right     OTHER SURGICAL HISTORY  02/02/2021    No history of surgery          Current Outpatient Medications:     aspirin 325 mg EC tablet, Take 1 tablet (325 mg) by mouth once daily. Do not fill before August 21, 2024., Disp: 15 tablet, Rfl: 0    multivitamin tablet, Take 1 tablet by mouth once daily., Disp: , Rfl:     ondansetron (Zofran) 4 mg tablet, Take 1 tablet (4 mg) by mouth every 8 hours if needed for nausea or vomiting., Disp: 20 tablet, Rfl: 0    oxyCODONE-acetaminophen (Percocet) 5-325 mg tablet, Take 1 tablet by mouth every 6 hours if needed for severe pain (7 - 10)., Disp: 20 tablet, Rfl: 0     No Known  Allergies     Social Connections: Not on file         PHYSICAL EXAM:  33 y.o. male well appearing in no acute distress. Alert and oriented ×3.  Skin intact bilateral lower extremities.   Antalgic gait with cane assist. Coordination and balance intact.  Bilateral lower extremity compartments supple.  5 out of 5 distal motor strength bilaterally.  L4 through S1 sensation intact bilaterally.  2+ DP/PT pulses bilaterally.  Right knee incisions are healing nicely.  There is a scab over the lateral incision midportion secondary to the brace rubbing.  This is without signs of infection or erythema.  It is not draining.  There is improved quad tone. Patient can perform an isometric quad contraction and straight leg raise without difficulty or lag. Active range of motion from 0 to 90 degrees. Negative Lachman's.     ASSESSMENT/PLAN:  Diagnosis:  Right knee ACL disruption, subsequent encounter    Plan:  1. Patient can discontinue the brace when resting and sleeping but continue when ambulatory for the next 2 weeks.  2. Continue with outpatient physical therapy and home exercise program.  It is okay for him to progress his range of motion now that he is 4 weeks postop.  He may discontinue the use of the cane or assistive device when he feels ready to do so.  We discussed not walking with a limp or having pain.  3. Continue frequent icing and use over-the-counter analgesics for pain and discomfort.  4. Appropriate activity and restrictions were reviewed.  He should continue to avoid higher level activities.  He should focus on regaining his range of motion, quadricep strength, extension stretches.  5. Follow up again in 4 weeks.    **This office note was dictated using Dragon voice to text software and was not proofread for spelling or grammatical errors

## 2024-09-19 ENCOUNTER — APPOINTMENT (OUTPATIENT)
Dept: PHYSICAL THERAPY | Facility: CLINIC | Age: 34
End: 2024-09-19
Payer: COMMERCIAL

## 2024-09-26 ENCOUNTER — TREATMENT (OUTPATIENT)
Dept: PHYSICAL THERAPY | Facility: CLINIC | Age: 34
End: 2024-09-26
Payer: COMMERCIAL

## 2024-09-26 DIAGNOSIS — M25.561 ACUTE PAIN OF RIGHT KNEE: ICD-10-CM

## 2024-09-26 DIAGNOSIS — Z47.89 ORTHOPEDIC AFTERCARE: ICD-10-CM

## 2024-09-26 DIAGNOSIS — S83.511A RUPTURE OF ANTERIOR CRUCIATE LIGAMENT OF RIGHT KNEE, INITIAL ENCOUNTER: ICD-10-CM

## 2024-09-26 PROCEDURE — 97110 THERAPEUTIC EXERCISES: CPT | Mod: GP

## 2024-09-26 NOTE — PROGRESS NOTES
Physical Therapy  Physical Therapy Treatment    Patient Name: Tobi Rosa  MRN: [id number]  Today's Date: 9/24/2024         Insurance:  Insurance Type: [employer]; $30 co-pay  Visit number: 9  Visit Limit: 60  Authorization info: NA    General:  Reason for visit: s/p R ACLR w/ BTB on 8/20/24  Referred by: Naren     Precautions: refer to surgical protocol    Current Problem  1. Acute pain of right knee        2. Orthopedic aftercare            Pain: 0/10    Subjective:   Subjective   Patient reports that he has been doing fine since last session. Notes that he has had mild increase in soreness, stiffness potentially due to increase in activity over the weekend.    HEP Compliance: Good    Objective:   Objective   Observation: no outward signs of infection     Wells DVT Criteria:  Active Cancer (1):  no  Immobility >3 days or major surgery <4 weeks (1): yes  Calf swelling >3 cm compared with other calf (1): no  collateral (non varicose) superficial veins present (1): no  entire leg swollen (1): no  localized tenderness along deep venous system (1): no  pitting edema, greater in symptomatic leg (1): no  paralysis, paresis, or recent plaster immobilization of the lower extremity (1): no  previously documented DVT (1): no  alternative diagnosis to DVT as likely or more likely (-2): no  -2-0: low risk   1-2 moderate risk  >3 high risk     Gait: WBAT without crutches and unlocked brace to 90 degrees     Knee AROM  Flexion  R: 120 deg  L: 145 deg  Extension  R: +1 deg   L: +2 deg      LE MMT:   No strength testing performed secondary to patient being post-op. Will be assessed at follow up visit.      Mtrigger quad sets: 1289 mV from 937 mV from 580 from mV     Neuromuscular Deficit Test: 27% from 68%    SLR: independent with no lag     Quad tone: improving  Effusion: 1+     Palpation: tenderness circumferentially along patella     Accessory Mobility: normal medial-lateral patellar mobility; hypomobile superior-inferior  directions    Circumferential Measurements  Mid Patellar  R: 40.5 cm  L: 38 cm    Treatments:       Therapeutic Exercise:                                 Upright bike x 5'  Heel prop into extension 15#   Supine knee extension stretch w/ 15#   Supine calf stretch w/ strap x 2'  Prone quad stretch 2 x 1'  Bridges 2 x 15   Mini squats to table 2 x 15  6 inch step ups 2 x 10  Cybex TKE w/ 10# 2 x 15   Wall sits 2 x 1'  Standing HS stretch x 1'  Standing gastroc stretch x 1'  HEP Review  Ice x 10 min    Manual Therapy:                          nt              PROM: flexion, extension  Patellar mobs    Neuromuscular Re-Education:            nt       NMES 55 rudolph, 10:10, quad sets, side SLR    Equipment Provided:   HEP Provided:  Access Code: [email or web address]    Charges: Therapeutic Exercise x 3    Assessment: Pt is 5 weeks post-op today and progressing well. Pt's knee flexion range has improved greatly since last session. Pt's extension is still hyper. Initiated closed chain strengthening exercises within protocol to improve weight bearing tolerance as well as improve quadriceps, hamstrings strength. Included wall sits for HEP. Will continue to progress as able.       Plan: Continue increasingly introducing closed chain strengthening within tolerance.        Jeffrey Quezada, PT

## 2024-10-01 NOTE — PROGRESS NOTES
Physical Therapy  Physical Therapy Treatment    Patient Name: Tobi Rosa  MRN: [id number]  Today's Date: 10/2/2024    Time Calculation  Start Time: 0922  Stop Time: 1019  Time Calculation (min): 57 min    Insurance:  Insurance Type: [employer]; $30 co-pay  Visit number: 10  Visit Limit: 60  Authorization info: NA    General:  Reason for visit: s/p R ACLR w/ BTB on 8/20/24  Referred by: Naren     Precautions: refer to surgical protocol    Current Problem  1. Acute pain of right knee  Follow Up In Physical Therapy      2. Orthopedic aftercare  Follow Up In Physical Therapy      3. Rupture of anterior cruciate ligament of right knee, initial encounter  Follow Up In Physical Therapy          Pain: 0/10    Subjective:   Subjective   Patient reports that he has been doing okay since last week. Notes lateral bulge around lateral scar but denies any pain associated. Denies any incident that could have led to aggravated symptoms.     HEP Compliance: Good    Objective:   Objective     Observation: no outward signs of infection     Wells DVT Criteria:  Active Cancer (1):  no  Immobility >3 days or major surgery <4 weeks (1): yes  Calf swelling >3 cm compared with other calf (1): no  collateral (non varicose) superficial veins present (1): no  entire leg swollen (1): no  localized tenderness along deep venous system (1): no  pitting edema, greater in symptomatic leg (1): no  paralysis, paresis, or recent plaster immobilization of the lower extremity (1): no  previously documented DVT (1): no  alternative diagnosis to DVT as likely or more likely (-2): no  -2-0: low risk   1-2 moderate risk  >3 high risk     Gait: WBAT without crutches and unlocked brace to 90 degrees     Knee AROM  Flexion  R: 125 deg; PROM; 125 deg   L: 145 deg  Extension  R: +1 deg   L: +2 deg      LE MMT:   No strength testing performed secondary to patient being post-op. Will be assessed at follow up visit.      Mtrigger quad sets: 1289 mV from 937 mV from 580  from mV     Neuromuscular Deficit Test: 27% from 68%    SLR: independent with no lag     Quad tone: improving  Effusion: 1+     Palpation: tenderness circumferentially along patella     Accessory Mobility: normal medial-lateral patellar mobility; hypomobile superior-inferior directions    Circumferential Measurements  Mid Patellar  R: 40.5 cm  L: 38 cm      Treatments:     Therapeutic Exercise:                                 Upright bike x 5'  Bridges 2 x 15   Goblet squats to table w/ 35# kb 3 x 10  4 inch step ups 3 x 10  4 inch lateral step downs 3 x 10  Lateral lunges w/ 15# kb 2 x 10  Modified depth dead lifts w/ 25# kb 2 x 10  Airex marching 2 x 20  HEP Printout  HEP Review  Ice x 15 min    Manual Therapy:                                        PROM: flexion, extension  Patellar mobs    Neuromuscular Re-Education:            nt       NMES 55 rudolph, 10:10, quad sets, side SLR    Equipment Provided:   HEP Provided:  Access Code: [email or web address]    Charges: Therapeutic Exercise x 2, Manual Therapy x 1    Assessment: Pt is 6 weeks, 1 day post-op and doing well. Pt's knee extension continues to be full and flexion range of motion improving. Pt has noted lateral knee bulge, swelling but denies any mechanical symptoms. Initiated light closed chain strengthening exercises within protocol, tolerance. Pt was able to perform without issues. Educated pt to go without brace at home. Provided HEP.       Plan: Continue progress double leg strength, balance and eventually progressing to single leg.        Jeffrey Quezada, PT

## 2024-10-02 ENCOUNTER — TREATMENT (OUTPATIENT)
Dept: PHYSICAL THERAPY | Facility: CLINIC | Age: 34
End: 2024-10-02
Payer: COMMERCIAL

## 2024-10-02 DIAGNOSIS — S83.511A RUPTURE OF ANTERIOR CRUCIATE LIGAMENT OF RIGHT KNEE, INITIAL ENCOUNTER: ICD-10-CM

## 2024-10-02 DIAGNOSIS — M25.561 ACUTE PAIN OF RIGHT KNEE: Primary | ICD-10-CM

## 2024-10-02 DIAGNOSIS — Z47.89 ORTHOPEDIC AFTERCARE: ICD-10-CM

## 2024-10-02 PROCEDURE — 97016 VASOPNEUMATIC DEVICE THERAPY: CPT | Mod: GP

## 2024-10-02 PROCEDURE — 97140 MANUAL THERAPY 1/> REGIONS: CPT | Mod: GP

## 2024-10-02 PROCEDURE — 97110 THERAPEUTIC EXERCISES: CPT | Mod: GP

## 2024-10-08 ENCOUNTER — DOCUMENTATION (OUTPATIENT)
Dept: ORTHOPEDIC SURGERY | Facility: HOSPITAL | Age: 34
End: 2024-10-08

## 2024-10-08 ENCOUNTER — TREATMENT (OUTPATIENT)
Dept: PHYSICAL THERAPY | Facility: CLINIC | Age: 34
End: 2024-10-08
Payer: COMMERCIAL

## 2024-10-08 DIAGNOSIS — S83.511A RUPTURE OF ANTERIOR CRUCIATE LIGAMENT OF RIGHT KNEE, INITIAL ENCOUNTER: ICD-10-CM

## 2024-10-08 DIAGNOSIS — Z47.89 ORTHOPEDIC AFTERCARE: ICD-10-CM

## 2024-10-08 DIAGNOSIS — M25.561 ACUTE PAIN OF RIGHT KNEE: Primary | ICD-10-CM

## 2024-10-08 PROCEDURE — 97110 THERAPEUTIC EXERCISES: CPT | Mod: GP

## 2024-10-08 PROCEDURE — 97016 VASOPNEUMATIC DEVICE THERAPY: CPT | Mod: GP

## 2024-10-08 NOTE — LETTER
October 8, 2024     Patient: Tobi Rosa   YOB: 1990   Date of Visit: 10/8/2024       To Whom It May Concern:    Tobi Rosa is under the direct of our care for a right knee ACL reconstruction.  He will need time to for post operative rehabilitation. He will need an additional 3 months for further strengthening of his right lower extremity.    If you have any questions or concerns, please don't hesitate to call.         Sincerely,         Emery Nick PA-C        CC: No Recipients

## 2024-10-08 NOTE — PROGRESS NOTES
Physical Therapy  Physical Therapy Treatment    Patient Name: Tobi Rosa  MRN: [id number]  Today's Date: 10/8/2024    Time Calculation  Start Time: 0955  Stop Time: 1045  Time Calculation (min): 50 min    Insurance:  Insurance Type: [employer]; $30 co-pay  Visit number: 11  Visit Limit: 60  Authorization info: NA    General:  Reason for visit: s/p R ACLR w/ BTB on 8/20/24  Referred by: Naren     Precautions: refer to surgical protocol      Current Problem  1. Acute pain of right knee  Follow Up In Physical Therapy      2. Orthopedic aftercare  Follow Up In Physical Therapy      3. Rupture of anterior cruciate ligament of right knee, initial encounter  Follow Up In Physical Therapy          Pain: 0/10    Goals:     PT Problem       PT Goal 1       Start:  08/22/24    Expected End:  11/22/24       1. Pt will demo understanding of and compliance with HEP to progress towards long term goal. (100% met)     2.  Pt will improve knee extension AROM to at least 0 degrees to improve quality of gait, quad activation. (100% met)     3. Pt will demo ability to perform 10 SLR without quad lag. (100% met)     4. Pt will demo decreased swelling as assessed with stroke test to demonstrate improved quadriceps activation to negotiate stairs. (Ongoing)     5. Pt will demo normalized gait pattern with use of assistive device to be able to walk at home. (Ongoing)           PT Goal 2       Start:  08/22/24    Expected End:  02/22/25       1. Pt will be independent and compliant with home program in order to safely return to work, recreational activities. (Ongoing)     2. Pt will increase LEFS outcome score to >80 pt's to demonstrate improved functional mobility for performance of workplace activities. (Ongoing)     3. ROM: full, pain free knee ROM, symmetrical with the uninvolved limb in order to return to normalized walking, running, and going up/down stairs. (Ongoing,)      4.  Strength: Isokinetic testing 90% or greater for hamstring and  quad (ongoing)     5. Effusion: No reactive effusion >1+ with sport-specific activity in order to safely return to running, cutting, change of direction, and sports including softball and basketball. (Ongoing)                    Subjective:   Subjective   Patient reports that he has been doing okay. Notes that he is walking slower due to going without brace. Also reports popping sensation along knee but denies any pain associated.     HEP Compliance: Good    Objective:   Objective     Observation: no outward signs of infection     Wells DVT Criteria:  Active Cancer (1):  no  Immobility >3 days or major surgery <4 weeks (1): yes  Calf swelling >3 cm compared with other calf (1): no  collateral (non varicose) superficial veins present (1): no  entire leg swollen (1): no  localized tenderness along deep venous system (1): no  pitting edema, greater in symptomatic leg (1): no  paralysis, paresis, or recent plaster immobilization of the lower extremity (1): no  previously documented DVT (1): no  alternative diagnosis to DVT as likely or more likely (-2): no  -2-0: low risk   1-2 moderate risk  >3 high risk     Gait: WBAT without crutches and without brace      Knee AROM  Flexion  R: 130 deg; PROM: 130 deg   L: 145 deg  Extension  R: 0 deg; post tx: +4 deg   L: +2 deg     LE MMT:   Knee Flexion  R: 3  L: 5  Knee Extension  R: 3  L: 5     Mtrigger quad sets: 1289 mV    Neuromuscular Deficit Test: 42% from 68%    SLR: independent with no lag     Quad tone: improving  Effusion: 1+     Palpation: tenderness circumferentially along patella     Accessory Mobility: normal medial-lateral patellar mobility; mildly hypomobile superior-inferior directions    Circumferential Measurements  Mid Patellar  R: 40 cm  L: 38 cm    LEFS: 34 from 5     Treatments:     Therapeutic Exercise:                                 Recumbent bike x 5'  Objective measures  LLD knee extension w/ heel prop w/ 15#   Cybex DL leg press 130# 3 x 10  Heels  elevated goblet squats 3 x 10  Standing marches w/ unilateral 20# db 2 x 20  HEP Printout  HEP Review  Ice x 15 min    Manual Therapy:                          nt              PROM: flexion, extension  Patellar mobs    Neuromuscular Re-Education:            nt       NMES 55 rudolph, 10:10, quad sets, side SLR    Equipment Provided:   HEP Provided:  Access Code: [email or web address]      Charges: Therapeutic Exercise x 3    Assessment: Upon re-examination, pt displayed improvements in gross lower extremity strength, pain, knee range of motion, functional mobility, and functional ability. However, pt is still having difficulty with gross lower extremity strength, balance, lower extremity flexibility, pain, knee range of motion, functional mobility, and functional ability. Pt has currently met 3/10 goals. Skilled physical therapy is still necessary in order to further improve aforementioned impairments to allow for increased participation in functional and recreational activities without limitations. Plan of care will consist of core, lower extremity stretching and strengthening in order to return to PLOF.         Plan: Continue to improve functional mobility, functional strength in order to increase participation in ADLs.         Jeffrey Quezada, PT

## 2024-10-11 ENCOUNTER — APPOINTMENT (OUTPATIENT)
Dept: PHYSICAL THERAPY | Facility: CLINIC | Age: 34
End: 2024-10-11
Payer: COMMERCIAL

## 2024-10-15 ENCOUNTER — TREATMENT (OUTPATIENT)
Dept: PHYSICAL THERAPY | Facility: CLINIC | Age: 34
End: 2024-10-15
Payer: COMMERCIAL

## 2024-10-15 DIAGNOSIS — Z47.89 ORTHOPEDIC AFTERCARE: ICD-10-CM

## 2024-10-15 DIAGNOSIS — M25.561 ACUTE PAIN OF RIGHT KNEE: ICD-10-CM

## 2024-10-15 DIAGNOSIS — S83.511A RUPTURE OF ANTERIOR CRUCIATE LIGAMENT OF RIGHT KNEE, INITIAL ENCOUNTER: ICD-10-CM

## 2024-10-15 PROCEDURE — 97016 VASOPNEUMATIC DEVICE THERAPY: CPT | Mod: GP

## 2024-10-15 PROCEDURE — 97110 THERAPEUTIC EXERCISES: CPT | Mod: GP

## 2024-10-15 NOTE — PROGRESS NOTES
Physical Therapy  Physical Therapy Treatment    Patient Name: Tobi Rosa  MRN: [id number]  Today's Date: 10/15/2024    Time Calculation  Start Time: 0950  Stop Time: 1038  Time Calculation (min): 48 min    Insurance:  Insurance Type: [employer]; $30 co-pay  Visit number: 12  Visit Limit: 60  Authorization info: NA    General:  Reason for visit: s/p R ACLR w/ BTB on 8/20/24  Referred by: Naren     Precautions: refer to surgical protocol      Current Problem  1. Acute pain of right knee  Follow Up In Physical Therapy      2. Orthopedic aftercare  Follow Up In Physical Therapy      3. Rupture of anterior cruciate ligament of right knee, initial encounter  Follow Up In Physical Therapy          Pain: 0/10    Subjective:   Subjective   Patient reports that he has been doing well, no complaints. Meets with MD tomorrow for follow up.    HEP Compliance: Good    Objective:   Objective     Observation: no outward signs of infection     Wells DVT Criteria:  Active Cancer (1):  no  Immobility >3 days or major surgery <4 weeks (1): yes  Calf swelling >3 cm compared with other calf (1): no  collateral (non varicose) superficial veins present (1): no  entire leg swollen (1): no  localized tenderness along deep venous system (1): no  pitting edema, greater in symptomatic leg (1): no  paralysis, paresis, or recent plaster immobilization of the lower extremity (1): no  previously documented DVT (1): no  alternative diagnosis to DVT as likely or more likely (-2): no  -2-0: low risk   1-2 moderate risk  >3 high risk     Gait: WBAT without crutches and without brace      Knee AROM  Flexion  R: 135 deg; PROM: 135 deg   L: 145 deg  Extension  R: +2 deg; post tx: +4 deg   L: +2 deg     Mtrigger quad sets: 1289 mV    Neuromuscular Deficit Test: 42% from 68%    SLR: independent with no lag     Quad tone: improving  Effusion: 1+     Palpation: tenderness circumferentially along patella     Accessory Mobility: normal medial-lateral patellar  mobility; mildly hypomobile superior-inferior directions    Circumferential Measurements  Mid Patellar  R: 40 cm  L: 38 cm        Treatments:     Therapeutic Exercise:                                 Upright bike x 5'  Supine calf stretch w/ strap x 2'  Supine HS stretch w/ strap x 2'  LLD knee extension w/ heel prop w/ 10#   6 inch step ups w/ 80% LOP BFR x 30, 15, 15, 15   4 inch lateral step downs w/ 80% LOP BFR x 30, 15, 15, 15   Education on brace management  HEP Review  Ice x 10 min    Manual Therapy:                          nt              PROM: flexion, extension  Patellar mobs    Equipment Provided:   HEP Provided:  Access Code: [email or web address]      Charges: Therapeutic Exercise x 3, Vasopneumatic Device  x 1    Assessment: The focus of the session was Strengthening, ROM, Stretching, Balance, Motor Control, Dynamic Stability Training, and functional training. The pt demonstrated Good tolerance to the noted exercises today. The pt is demonstrated Good progress in skilled rehab at this time. The pt is still limited in overall Strength, ROM, Flexibility, Motor control, Balance, Gait mechanics, Effusion, and Pain at this time. The pt continues to be a good candidate for skilled PT, in order to further improve Strength, ROM, Flexibility, Motor control, Balance, Gait mechanics, Effusion, and Pain.          Plan: Continue to progress single, double leg strengthening, balance, flexibility within protocol.       Jeffrey Quezada, PT

## 2024-10-16 ENCOUNTER — OFFICE VISIT (OUTPATIENT)
Dept: ORTHOPEDIC SURGERY | Facility: HOSPITAL | Age: 34
End: 2024-10-16
Payer: COMMERCIAL

## 2024-10-16 DIAGNOSIS — S83.282D ACUTE LATERAL MENISCUS TEAR OF LEFT KNEE, SUBSEQUENT ENCOUNTER: ICD-10-CM

## 2024-10-16 DIAGNOSIS — S83.511D DISRUPTION OF ANTERIOR CRUCIATE LIGAMENT OF KNEE, RIGHT, SUBSEQUENT ENCOUNTER: Primary | ICD-10-CM

## 2024-10-16 PROCEDURE — 99211 OFF/OP EST MAY X REQ PHY/QHP: CPT | Performed by: SPECIALIST/TECHNOLOGIST

## 2024-10-16 NOTE — PROGRESS NOTES
"Subjective    Patient ID: Tobi Rosa is a 33 y.o. male.    Procedure: Right knee ACL reconstruction with patellar tendon autograft, lateral meniscus repair and IT band tenodesis  Date of surgery: 8/20/2024      HPI:  Tobi Rosa is a 33 y.o. male presenting 8 weeks status post right knee anterior cruciate ligament reconstruction with with patellar tendon autograft, lateral meniscus repair and IT band tenodesis.  Overall he is doing well.  He denies any problems since his last visit and reports no adverse events.  He did attend a [employer] tournament over the weekend and wore his brace and took his cane with him.  He said there was some uneven terrain with walking but tolerated it well.  His knee felt stable.  Today he reports a little tightness over the anterior knee especially with walking.  He says that the numb sensation around the knee is starting to subside.  He did notice some \"bulging\" over the lateral aspect of his knee when his knee was flexed, which has begun to resolve. Pain is minimal. They deny any instability. They continue with physical therapy and is progressing. They present for continued treatment recommendations.     ROS:  Constitutional: No fever, no chills, not feeling tired, no recent weight gain and no recent weight loss  ENT: No nosebleeds  Cardiovascular: No chest pain  Respiratory: No shortness of breath and no cough  Gastrointestinal: No abdominal pain, no nausea, no diarrhea, and no vomiting  Musculoskeletal: No arthralgias  Integumentary: No rashes and no skin lesions  Neurological: No headache  Psychiatric: No sleep disturbances no depression  Endocrine: No muscle weakness and no muscle cramps  Hematologic/lymphatic: No swelling glands and no tendency for easy bruising    Past Medical History:   Diagnosis Date    Asthma ([facility])     Stomach ulcer         Past Surgical History:   Procedure Laterality Date    FINGER SURGERY Right     OTHER SURGICAL HISTORY  02/02/2021    No history " of surgery          Current Outpatient Medications:     aspirin 325 mg EC tablet, Take 1 tablet (325 mg) by mouth once daily. Do not fill before August 21, 2024. (Patient not taking: Reported on 9/18/2024), Disp: 15 tablet, Rfl: 0    multivitamin tablet, Take 1 tablet by mouth once daily., Disp: , Rfl:     ondansetron (Zofran) 4 mg tablet, Take 1 tablet (4 mg) by mouth every 8 hours if needed for nausea or vomiting. (Patient not taking: Reported on 9/18/2024), Disp: 20 tablet, Rfl: 0    oxyCODONE-acetaminophen (Percocet) 5-325 mg tablet, Take 1 tablet by mouth every 6 hours if needed for severe pain (7 - 10). (Patient not taking: Reported on 9/18/2024), Disp: 20 tablet, Rfl: 0     No Known Allergies     Social Connections: Not on file        PHYSICAL EXAM:  33 y.o. male well appearing in no acute distress. Alert and oriented ×3.  Skin intact bilateral lower extremities.   Tandem gait. Coordination and balance intact.  Bilateral lower extremity compartments supple.  5 out of 5 distal motor strength bilaterally.  L4 through S1 sensation intact bilaterally.  2+ DP/PT pulses bilaterally.  Right knee incisions are healed nicely. There is improved quad tone. Patient can perform an isometric quad contraction and straight leg raise difficulty or lag. Active range of motion from 0-120 degrees. Negative Lachman's. Negative Homans.  Trace effusion.      ASSESSMENT/PLAN:  Assessment:  Right knee ACL disruption, subsequent encounter    Plan:  1. Patient can begin to use the exercise bike for exercise. Focus on continued quadriceps strengthening and lower extremity flexibility.  2. Continue with outpatient physical therapy and home exercise program.  Emphasized the importance of continued quadricep strengthening and lower extremity endurance in preparation to start his walk jog program.  3. Continue frequent icing and use over-the-counter analgesics for pain and discomfort.  4. Appropriate activity and restrictions were reviewed.   He should avoid doing any running, jumping twisting or turning or medial MMA type activities at this point.  It is okay for him to go to the tournaments and watch.  He has been taking his brace with him for longer periods of time walking and I feel this is reasonable for him to continue.  5. Follow up again in 4 weeks with Jona Guerrero and Dr. Sneed.  He is in agreement the plan.  Questions are answered.    **This office note was dictated using Dragon voice to text software and was not proofread for spelling or grammatical errors

## 2024-10-22 ENCOUNTER — APPOINTMENT (OUTPATIENT)
Dept: PHYSICAL THERAPY | Facility: CLINIC | Age: 34
End: 2024-10-22
Payer: COMMERCIAL

## 2024-10-31 ENCOUNTER — APPOINTMENT (OUTPATIENT)
Dept: PHYSICAL THERAPY | Facility: CLINIC | Age: 34
End: 2024-10-31
Payer: COMMERCIAL

## 2024-10-31 ENCOUNTER — TREATMENT (OUTPATIENT)
Dept: PHYSICAL THERAPY | Facility: CLINIC | Age: 34
End: 2024-10-31
Payer: COMMERCIAL

## 2024-10-31 DIAGNOSIS — S83.511A RUPTURE OF ANTERIOR CRUCIATE LIGAMENT OF RIGHT KNEE, INITIAL ENCOUNTER: ICD-10-CM

## 2024-10-31 DIAGNOSIS — M25.561 ACUTE PAIN OF RIGHT KNEE: ICD-10-CM

## 2024-10-31 DIAGNOSIS — Z47.89 ORTHOPEDIC AFTERCARE: ICD-10-CM

## 2024-10-31 PROCEDURE — 97110 THERAPEUTIC EXERCISES: CPT | Mod: GP

## 2024-10-31 PROCEDURE — 97016 VASOPNEUMATIC DEVICE THERAPY: CPT | Mod: GP

## 2024-11-06 NOTE — PROGRESS NOTES
Physical Therapy  Physical Therapy Treatment    Patient Name: Tobi Rosa  MRN: [id number]  Today's Date: 11/7/2024    Time Calculation  Start Time: 0950  Stop Time: 1043  Time Calculation (min): 53 min    Insurance:  Insurance Type: [employer]; $30 co-pay  Visit number: 14  Visit Limit: 60  Authorization info: NA    General:  Reason for visit: s/p R ACLR w/ BTB on 8/20/24  Referred by: Naren     Precautions: refer to surgical protocol    Current Problem  1. Acute pain of right knee  Follow Up In Physical Therapy      2. Orthopedic aftercare  Follow Up In Physical Therapy      3. Rupture of anterior cruciate ligament of right knee, initial encounter  Follow Up In Physical Therapy          Pain: 0/10    Subjective:   Subjective   Patient reports that he was on his feet walking more than usual over the last weekend and did fine. Notes that he continues to have difficulty with stair negotiation due to lack of strength.    HEP Compliance: Fair    Objective:   Objective   Observation: no outward signs of infection     Wells DVT Criteria:  Active Cancer (1):  no  Immobility >3 days or major surgery <4 weeks (1): yes  Calf swelling >3 cm compared with other calf (1): no  collateral (non varicose) superficial veins present (1): no  entire leg swollen (1): no  localized tenderness along deep venous system (1): no  pitting edema, greater in symptomatic leg (1): no  paralysis, paresis, or recent plaster immobilization of the lower extremity (1): no  previously documented DVT (1): no  alternative diagnosis to DVT as likely or more likely (-2): no  -2-0: low risk   1-2 moderate risk  >3 high risk     Gait: independent     Knee AROM  Flexion  R: 135 deg; PROM: 135 deg   L: 145 deg  Extension  R: 0 deg; post tx: +4 deg   L: +2 deg     Mtrigger quad sets: 1350 mV    Neuromuscular Deficit Test: 26% from 42% from 68%    SLR: independent with no lag     Quad tone: improving  Effusion: 1+     Palpation: tenderness circumferentially along  patella     Accessory Mobility: normal medial-lateral patellar mobility; mildly hypomobile superior-inferior directions    Circumferential Measurements  Mid Patellar  R: 39.5 cm  L: 38 cm      Treatments:     Therapeutic Exercise:                                 Upright bike, L4 x 5'  Prone heel prop into extension w/ 10# x 5'  80% LOP 4 inch frontal step downs x 30, 15, 15, 15   2-1 leg press 80# 3 x 10  Cybex HS curl machine 80# 2 x 15   SL balance in and outs w/ 5# ball 2 x 20  SL balance up and downs w/ 5# ball 2 x 20  SL balance cw/ccw passes w/ 5# ball 2 x 20  HEP Review  Ice x 10 min    Equipment Provided:   HEP Provided:  Access Code: [email or web address]    Charges: Therapeutic Exercise x 3, Vasopneumatic Device  x 1    Assessment: Pt's range of motion continues to be within normal limits. Utilized BFR to improve quadriceps strength for improved overall functional ability and future return to sporting activities. Pt was able to tolerate treatment without complaints. Continued education to perform HEP as prescribed.        Plan: Continue progressing SL strength.       Jeffrey Quezada, PT

## 2024-11-07 ENCOUNTER — TREATMENT (OUTPATIENT)
Dept: PHYSICAL THERAPY | Facility: CLINIC | Age: 34
End: 2024-11-07
Payer: COMMERCIAL

## 2024-11-07 DIAGNOSIS — S83.511A RUPTURE OF ANTERIOR CRUCIATE LIGAMENT OF RIGHT KNEE, INITIAL ENCOUNTER: ICD-10-CM

## 2024-11-07 DIAGNOSIS — M25.561 ACUTE PAIN OF RIGHT KNEE: Primary | ICD-10-CM

## 2024-11-07 DIAGNOSIS — Z47.89 ORTHOPEDIC AFTERCARE: ICD-10-CM

## 2024-11-07 PROCEDURE — 97110 THERAPEUTIC EXERCISES: CPT | Mod: GP

## 2024-11-07 PROCEDURE — 97016 VASOPNEUMATIC DEVICE THERAPY: CPT | Mod: GP

## 2024-11-14 ENCOUNTER — APPOINTMENT (OUTPATIENT)
Dept: PHYSICAL THERAPY | Facility: CLINIC | Age: 34
End: 2024-11-14
Payer: COMMERCIAL

## 2024-11-14 DIAGNOSIS — Z47.89 ORTHOPEDIC AFTERCARE: ICD-10-CM

## 2024-11-14 DIAGNOSIS — M25.561 ACUTE PAIN OF RIGHT KNEE: Primary | ICD-10-CM

## 2024-11-14 NOTE — PROGRESS NOTES
Physical Therapy  Physical Therapy Treatment    Patient Name: Tobi Rosa  MRN: [id number]  Today's Date: 11/14/2024         Insurance:  Insurance Type: [employer]; $30 co-pay  Visit number: 15  Visit Limit: 60  Authorization info: NA    General:  Reason for visit: s/p R ACLR w/ BTB on 8/20/24  Referred by: Naren     Precautions: refer to surgical protocol    Current Problem  1. Acute pain of right knee        2. Orthopedic aftercare            Pain: {JAMEVALGOALS1:07934}/10    Subjective:   Subjective   Patient reports ***    HEP Compliance: {JAMHEPCOMP:62160}    Objective:   Objective       Observation: no outward signs of infection     Wells DVT Criteria:  Active Cancer (1):  no  Immobility >3 days or major surgery <4 weeks (1): yes  Calf swelling >3 cm compared with other calf (1): no  collateral (non varicose) superficial veins present (1): no  entire leg swollen (1): no  localized tenderness along deep venous system (1): no  pitting edema, greater in symptomatic leg (1): no  paralysis, paresis, or recent plaster immobilization of the lower extremity (1): no  previously documented DVT (1): no  alternative diagnosis to DVT as likely or more likely (-2): no  -2-0: low risk   1-2 moderate risk  >3 high risk     Gait: independent     Knee AROM  Flexion  R: 135 deg; PROM: 135 deg   L: 145 deg  Extension  R: 0 deg; post tx: +4 deg   L: +2 deg     Mtrigger quad sets: 1350 mV    Neuromuscular Deficit Test: 26% from 42% from 68%    SLR: independent with no lag     Quad tone: improving  Effusion: 1+     Palpation: tenderness circumferentially along patella     Accessory Mobility: normal medial-lateral patellar mobility; mildly hypomobile superior-inferior directions    Circumferential Measurements  Mid Patellar  R: 39.5 cm  L: 38 cm      Treatments:     Therapeutic Exercise:                                 Upright bike, L4 x 5'  Prone heel prop into extension w/ 10# x 5'  80% LOP 4 inch frontal step downs x 30, 15, 15, 15    2-1 leg press 80# 3 x 10  Cybex HS curl machine 80# 2 x 15   SL balance in and outs w/ 5# ball 2 x 20  SL balance up and downs w/ 5# ball 2 x 20  SL balance cw/ccw passes w/ 5# ball 2 x 20  HEP Review  Ice x 10 min    Equipment Provided:   HEP Provided:  Access Code: [email or web address]          Equipment Provided:   HEP Provided:      Charges: {jamcharges:81941} x {JAMEVALGOALS1:09350}, {jamcharges:01319} x {JAMEVALGOALS1:22087}    Assessment: ***       Plan: ***       Jeffrey Quezada, PT

## 2024-11-20 ENCOUNTER — APPOINTMENT (OUTPATIENT)
Dept: ORTHOPEDIC SURGERY | Facility: HOSPITAL | Age: 34
End: 2024-11-20
Payer: COMMERCIAL

## 2024-11-20 ENCOUNTER — OFFICE VISIT (OUTPATIENT)
Dept: ORTHOPEDIC SURGERY | Facility: HOSPITAL | Age: 34
End: 2024-11-20
Payer: COMMERCIAL

## 2024-11-20 VITALS — WEIGHT: 210 LBS | BODY MASS INDEX: 29.4 KG/M2 | HEIGHT: 71 IN

## 2024-11-20 DIAGNOSIS — S83.282D ACUTE LATERAL MENISCUS TEAR OF LEFT KNEE, SUBSEQUENT ENCOUNTER: ICD-10-CM

## 2024-11-20 DIAGNOSIS — S83.511D DISRUPTION OF ANTERIOR CRUCIATE LIGAMENT OF KNEE, RIGHT, SUBSEQUENT ENCOUNTER: Primary | ICD-10-CM

## 2024-11-20 PROBLEM — S83.282A ACUTE LATERAL MENISCUS TEAR OF LEFT KNEE: Status: ACTIVE | Noted: 2024-11-20

## 2024-11-20 PROCEDURE — 3008F BODY MASS INDEX DOCD: CPT | Performed by: PHYSICIAN ASSISTANT

## 2024-11-20 PROCEDURE — 99211 OFF/OP EST MAY X REQ PHY/QHP: CPT | Performed by: PHYSICIAN ASSISTANT

## 2024-11-20 ASSESSMENT — PAIN - FUNCTIONAL ASSESSMENT: PAIN_FUNCTIONAL_ASSESSMENT: NO/DENIES PAIN

## 2024-11-20 NOTE — PROGRESS NOTES
Subjective    Patient ID: Tobi Rosa is a 34 y.o. male.    Procedure: Right knee ACL reconstruction with patella tendon autograft, lateral meniscus repair, and IT band tenodesis  Date of surgery: 8/20/2024      HPI:  Tobi Rosa is a 34 y.o. male who is status post 3 months right knee ACL reconstruction with patella tendon autograft, lateral meniscus repair, and IT band tenodesis.  No problems or adverse events reported.  He feels that physical therapy is progressing.  He states that he has occasional pain at night but it is fairly intermittent.    ROS  Constitutional: No fever, no chills, not feeling tired, no recent weight gain and no recent weight loss  ENT: No nosebleeds  Cardiovascular: No chest pain  Respiratory: No shortness of breath and no cough  Gastrointestinal: No abdominal pain, no nausea, no diarrhea, and no vomiting  Musculoskeletal: No arthralgias  Integumentary: No rashes and no skin lesions  Neurological: No headache  Psychiatric: No sleep disturbances no depression  Endocrine: No muscle weakness and no muscle cramps  Hematologic/lymphatic: No swelling glands and no tendency for easy bruising      Objective   34-year-old male well appearing in no acute distress. Alert and oriented ×3.  Skin intact bilateral lower extremities.   Normal tandem gait. Coordination and balance intact.  Bilateral lower extremity compartments supple.  5 out of 5 distal motor strength bilaterally.  L4 through S1 sensation intact bilaterally.  2+ DP/PT pulses bilaterally.  Right knee incisions are healing nicely with minimal scarring.  No effusion.  He has improved quad tone.  Active range of motion 0 to 135 degrees.  Negative Lachman's.        Assessment/Plan   Encounter Diagnoses:  Disruption of anterior cruciate ligament of knee, right, subsequent encounter    Acute lateral meniscus tear of left knee, subsequent encounter    No orders of the defined types were placed in this encounter.      Overall he is  progressing nicely.  He was measured for his functional brace today.  He can start a jogging program at his physical therapist discretion.  Continue frequent icing.  He may use over-the-counter medicines for any pain or discomfort.  Will have him return back to the office for a follow-up again in 2 months.    Patient was prescribed a functional brace for ACL distress.The patient is ambulatory with or without aid; but, has weakness of their right knee which requires stabilization from this orthosis to improve their function and protect the reconstruction.      Verbal and written instructions for the use, wear schedule, cleaning and application of this item were given.  Patient was instructed that should the brace result in increased pain, decreased sensation, increased swelling, or an overall worsening of their medical condition, to please contact our office immediately.     Orthotic management and training was provided for skin care, modifications due to healing tissues, edema changes, interruption in skin integrity, and safety precautions with the orthosis.    This office note was dictated using Dragon voice to text software and was not proofread for spelling or grammatical errors

## 2024-11-20 NOTE — PROGRESS NOTES
Physical Therapy  Physical Therapy Treatment    Patient Name: Tobi Rosa  MRN: [id number]  Today's Date: 11/20/2024         Insurance:  Insurance Type: [employer]; $30 co-pay  Visit number: 15  Visit Limit: 60  Authorization info: NA    General:  Reason for visit: s/p R ACLR w/ BTB on 8/20/24  Referred by: Naren     Precautions: refer to surgical protocol    Current Problem  1. Acute pain of right knee        2. Orthopedic aftercare              Pain: {JAMEVALGOALS1:94896}/10    Subjective:   Subjective   Patient reports ***    HEP Compliance: {JAMHEPCOMP:16647}    Objective:   Objective       Observation: no outward signs of infection     Wells DVT Criteria:  Active Cancer (1):  no  Immobility >3 days or major surgery <4 weeks (1): yes  Calf swelling >3 cm compared with other calf (1): no  collateral (non varicose) superficial veins present (1): no  entire leg swollen (1): no  localized tenderness along deep venous system (1): no  pitting edema, greater in symptomatic leg (1): no  paralysis, paresis, or recent plaster immobilization of the lower extremity (1): no  previously documented DVT (1): no  alternative diagnosis to DVT as likely or more likely (-2): no  -2-0: low risk   1-2 moderate risk  >3 high risk     Gait: independent     Knee AROM  Flexion  R: 135 deg; PROM: 135 deg   L: 145 deg  Extension  R: 0 deg; post tx: +4 deg   L: +2 deg     Mtrigger quad sets: 1350 mV    Neuromuscular Deficit Test: 26% from 42% from 68%    SLR: independent with no lag     Quad tone: improving  Effusion: 1+     Palpation: tenderness circumferentially along patella     Accessory Mobility: normal medial-lateral patellar mobility; mildly hypomobile superior-inferior directions    Circumferential Measurements  Mid Patellar  R: 39.5 cm  L: 38 cm      Treatments:     Therapeutic Exercise:                                 Upright bike, L4 x 5'  Prone heel prop into extension w/ 10# x 5'  80% LOP 4 inch frontal step downs x 30, 15, 15, 15    2-1 leg press 80# 3 x 10  Cybex HS curl machine 80# 2 x 15   SL balance in and outs w/ 5# ball 2 x 20  SL balance up and downs w/ 5# ball 2 x 20  SL balance cw/ccw passes w/ 5# ball 2 x 20  HEP Review  Ice x 10 min    Equipment Provided:   HEP Provided:  Access Code: [email or web address]          Equipment Provided:   HEP Provided:      Charges: {jamcharges:94944} x {JAMEVALGOALS1:25095}, {jamcharges:01782} x {JAMEVALGOALS1:13763}    Assessment: ***       Plan: ***       Jeffrey Quezada, PT

## 2024-11-21 ENCOUNTER — APPOINTMENT (OUTPATIENT)
Dept: PHYSICAL THERAPY | Facility: CLINIC | Age: 34
End: 2024-11-21
Payer: COMMERCIAL

## 2024-11-21 DIAGNOSIS — M25.561 ACUTE PAIN OF RIGHT KNEE: Primary | ICD-10-CM

## 2024-11-21 DIAGNOSIS — Z47.89 ORTHOPEDIC AFTERCARE: ICD-10-CM

## 2024-11-22 ENCOUNTER — TREATMENT (OUTPATIENT)
Dept: PHYSICAL THERAPY | Facility: CLINIC | Age: 34
End: 2024-11-22
Payer: COMMERCIAL

## 2024-11-22 DIAGNOSIS — M25.561 ACUTE PAIN OF RIGHT KNEE: ICD-10-CM

## 2024-11-22 DIAGNOSIS — S83.511A RUPTURE OF ANTERIOR CRUCIATE LIGAMENT OF RIGHT KNEE, INITIAL ENCOUNTER: ICD-10-CM

## 2024-11-22 DIAGNOSIS — Z47.89 ORTHOPEDIC AFTERCARE: ICD-10-CM

## 2024-11-22 PROCEDURE — 97110 THERAPEUTIC EXERCISES: CPT | Mod: GP,CQ

## 2024-11-22 NOTE — PROGRESS NOTES
Physical Therapy  Physical Therapy Treatment    Patient Name: Tobi Rosa  MRN: [id number]  Today's Date: 11/22/2024    Time Calculation  Start Time: 1047  Stop Time: 1138  Time Calculation (min): 51 min    Insurance:  Insurance Type: [employer]; $30 co-pay  Visit number: 15  Visit Limit: 60  Authorization info: NA    General:  Reason for visit: s/p R ACLR w/ BTB on 8/20/24  Referred by: Naren     Precautions: refer to surgical protocol    Current Problem  1. Acute pain of right knee  Follow Up In Physical Therapy      2. Orthopedic aftercare  Follow Up In Physical Therapy      3. Rupture of anterior cruciate ligament of right knee, initial encounter  Follow Up In Physical Therapy          Pain: 0/10    Subjective:   Subjective   Patient reports still having pain while descending stairs. Pt reports having gone back to work for the first time since surgery. Pt reports 3 hour shifts to help adjust into working.     HEP Compliance: Good    Objective:   Objective   Observable quivering occurring during SL exercises.     Observation: no outward signs of infection     Wells DVT Criteria:  Active Cancer (1):  no  Immobility >3 days or major surgery <4 weeks (1): yes  Calf swelling >3 cm compared with other calf (1): no  collateral (non varicose) superficial veins present (1): no  entire leg swollen (1): no  localized tenderness along deep venous system (1): no  pitting edema, greater in symptomatic leg (1): no  paralysis, paresis, or recent plaster immobilization of the lower extremity (1): no  previously documented DVT (1): no  alternative diagnosis to DVT as likely or more likely (-2): no  -2-0: low risk   1-2 moderate risk  >3 high risk     Gait: independent     Knee AROM  Flexion  R: 135 deg; PROM: 135 deg   L: 145 deg  Extension  R: 0 deg; post tx: +4 deg   L: +2 deg     Mtrigger quad sets: 1350 mV    Neuromuscular Deficit Test: 26% from 42% from 68%    SLR: independent with no lag     Quad tone: improving  Effusion: 1+      Palpation: tenderness circumferentially along patella     Accessory Mobility: normal medial-lateral patellar mobility; mildly hypomobile superior-inferior directions    Circumferential Measurements  Mid Patellar  R: 39.5 cm  L: 38 cm      Treatments:     Therapeutic Exercise:                                 Upright bike, L4 x 5'  Heel taps 3 inch steps 3x10   Cybex HS curl machine 50# 3x10   SL squats on plinth 3x10   Gastroc stretch 1'  Hamstring stretch stairs 1'   2-1 leg press 3x8 90#  Ice x 10 min    Access Code: [email or web address]  Equipment Provided:   HEP Provided:      Charges: Therapeutic Exercise x 3,     Assessment:    Pt displayed quad weakness while performing SL squats onto plinth requiring plinth be raised up half way through the working volume. Pt required decreased resistance of HS curl machine as well on last set.     Plan:    Continue to progress SL quad strengthening activities per patient tolerance.     Lane Cohen, PTA

## 2024-11-27 ENCOUNTER — APPOINTMENT (OUTPATIENT)
Dept: SPORTS MEDICINE | Facility: HOSPITAL | Age: 34
End: 2024-11-27
Payer: COMMERCIAL

## 2024-12-04 NOTE — PROGRESS NOTES
Physical Therapy  Physical Therapy Treatment    Patient Name: Tobi Rosa  MRN: [id number]  Today's Date: 12/4/2024         Insurance:  Insurance Type: [employer]; $30 co-pay  Visit number: 16  Visit Limit: 60  Authorization info: NA    General:  Reason for visit: s/p R ACLR w/ BTB on 8/20/24  Referred by: Naren     Precautions: refer to surgical protocol      Current Problem  1. Acute pain of right knee        2. Orthopedic aftercare            Pain: {JAMEVALGOALS1:76734}/10    Subjective:   Subjective   Patient reports ***    HEP Compliance: {JAMHEPCOMP:75104}    Objective:   Objective     Observation: no outward signs of infection     Wells DVT Criteria:  Active Cancer (1):  no  Immobility >3 days or major surgery <4 weeks (1): yes  Calf swelling >3 cm compared with other calf (1): no  collateral (non varicose) superficial veins present (1): no  entire leg swollen (1): no  localized tenderness along deep venous system (1): no  pitting edema, greater in symptomatic leg (1): no  paralysis, paresis, or recent plaster immobilization of the lower extremity (1): no  previously documented DVT (1): no  alternative diagnosis to DVT as likely or more likely (-2): no  -2-0: low risk   1-2 moderate risk  >3 high risk     Gait: independent     Knee AROM  Flexion  R: 135 deg; PROM: 135 deg   L: 145 deg  Extension  R: 0 deg; post tx: +4 deg   L: +2 deg     Mtrigger quad sets: 1350 mV    Neuromuscular Deficit Test: 26% from 42% from 68%    SLR: independent with no lag     Quad tone: improving  Effusion: 1+     Palpation: tenderness circumferentially along patella     Accessory Mobility: normal medial-lateral patellar mobility; mildly hypomobile superior-inferior directions    Circumferential Measurements  Mid Patellar  R: 39.5 cm  L: 38 cm      Treatments:     Therapeutic Exercise:                                 Upright bike, L4 x 5'  Prone heel prop into extension w/ 10# x 5'  80% LOP 4 inch frontal step downs x 30, 15, 15, 15    2-1 leg press 80# 3 x 10  Cybex HS curl machine 80# 2 x 15   SL balance in and outs w/ 5# ball 2 x 20  SL balance up and downs w/ 5# ball 2 x 20  SL balance cw/ccw passes w/ 5# ball 2 x 20  HEP Review  Ice x 10 min    Equipment Provided:   HEP Provided:  Access Code: [email or web address]      Equipment Provided:   HEP Provided:      Charges: {jamcharges:23979} x {JAMEVALGOALS1:97229}, {jamcharges:35064} x {JAMEVALGOALS1:51619}    Assessment: ***       Plan: ***       Jeffrey Quezada, PT

## 2024-12-05 ENCOUNTER — APPOINTMENT (OUTPATIENT)
Dept: PHYSICAL THERAPY | Facility: CLINIC | Age: 34
End: 2024-12-05
Payer: COMMERCIAL

## 2024-12-12 ENCOUNTER — APPOINTMENT (OUTPATIENT)
Dept: PHYSICAL THERAPY | Facility: CLINIC | Age: 34
End: 2024-12-12
Payer: COMMERCIAL

## 2024-12-18 NOTE — PROGRESS NOTES
Physical Therapy  Physical Therapy Treatment    Patient Name: Tobi Rosa  MRN: [id number]  Today's Date: 12/18/2024         Insurance:  Insurance Type: [employer]; $30 co-pay  Visit number: 16  Visit Limit: 60  Authorization info: NA    General:  Reason for visit: s/p R ACLR w/ BTB on 8/20/24  Referred by: Naren     Precautions: refer to surgical protocol      Current Problem  1. Acute pain of right knee        2. Orthopedic aftercare              Pain: {JAMEVALGOALS1:72395}/10    Subjective:   Subjective   Patient reports ***    HEP Compliance: {JAMHEPCOMP:22945}    Objective:   Objective     Observation: no outward signs of infection     Wells DVT Criteria:  Active Cancer (1):  no  Immobility >3 days or major surgery <4 weeks (1): yes  Calf swelling >3 cm compared with other calf (1): no  collateral (non varicose) superficial veins present (1): no  entire leg swollen (1): no  localized tenderness along deep venous system (1): no  pitting edema, greater in symptomatic leg (1): no  paralysis, paresis, or recent plaster immobilization of the lower extremity (1): no  previously documented DVT (1): no  alternative diagnosis to DVT as likely or more likely (-2): no  -2-0: low risk   1-2 moderate risk  >3 high risk     Gait: independent     Knee AROM  Flexion  R: 135 deg; PROM: 135 deg   L: 145 deg  Extension  R: 0 deg; post tx: +4 deg   L: +2 deg     Mtrigger quad sets: 1350 mV    Neuromuscular Deficit Test: 26% from 42% from 68%    SLR: independent with no lag     Quad tone: improving  Effusion: 1+     Palpation: tenderness circumferentially along patella     Accessory Mobility: normal medial-lateral patellar mobility; mildly hypomobile superior-inferior directions    Circumferential Measurements  Mid Patellar  R: 39.5 cm  L: 38 cm      Treatments:     Therapeutic Exercise:                                 Upright bike, L4 x 5'  Prone heel prop into extension w/ 10# x 5'  80% LOP 4 inch frontal step downs x 30, 15, 15, 15    2-1 leg press 80# 3 x 10  Cybex HS curl machine 80# 2 x 15   SL balance in and outs w/ 5# ball 2 x 20  SL balance up and downs w/ 5# ball 2 x 20  SL balance cw/ccw passes w/ 5# ball 2 x 20  HEP Review  Ice x 10 min    Equipment Provided:   HEP Provided:  Access Code: [email or web address]      Equipment Provided:   HEP Provided:      Charges: {jamcharges:90184} x {JAMEVALGOALS1:92989}, {jamcharges:83666} x {JAMEVALGOALS1:86332}    Assessment: ***       Plan: ***       Jeffrey Quezada, PT

## 2024-12-19 ENCOUNTER — APPOINTMENT (OUTPATIENT)
Dept: PHYSICAL THERAPY | Facility: CLINIC | Age: 34
End: 2024-12-19
Payer: COMMERCIAL

## 2025-01-27 ENCOUNTER — OFFICE VISIT (OUTPATIENT)
Dept: ORTHOPEDIC SURGERY | Facility: HOSPITAL | Age: 35
End: 2025-01-27
Payer: COMMERCIAL

## 2025-01-27 DIAGNOSIS — S83.282D ACUTE LATERAL MENISCUS TEAR OF LEFT KNEE, SUBSEQUENT ENCOUNTER: ICD-10-CM

## 2025-01-27 DIAGNOSIS — S83.511D DISRUPTION OF ANTERIOR CRUCIATE LIGAMENT OF KNEE, RIGHT, SUBSEQUENT ENCOUNTER: Primary | ICD-10-CM

## 2025-01-27 PROCEDURE — 99213 OFFICE O/P EST LOW 20 MIN: CPT | Performed by: PHYSICIAN ASSISTANT

## 2025-01-27 PROCEDURE — 1036F TOBACCO NON-USER: CPT | Performed by: PHYSICIAN ASSISTANT

## 2025-01-27 ASSESSMENT — PAIN - FUNCTIONAL ASSESSMENT: PAIN_FUNCTIONAL_ASSESSMENT: 0-10

## 2025-01-27 ASSESSMENT — PAIN SCALES - GENERAL: PAINLEVEL_OUTOF10: 0 - NO PAIN

## 2025-01-27 NOTE — PROGRESS NOTES
Subjective    Patient ID: Tobi Rosa is a 34 y.o. male.    Procedure: Right knee ACL reconstruction with patella tendon autograft, lateral meniscus repair, and IT band tenodesis  Date of surgery: 8/20/2024    HPI:  Tobi Rosa is a 34 y.o. male who is status post 5 months right knee ACL reconstruction with patella tendon autograft, lateral meniscus repair, and IT band tenodesis.  No adverse events reported.  He had to put physical therapy on hold for a brief period as he returned back to work.  Now that he is getting more comfortable with his schedule he feels that he is ready to return back to PT.  He has his functional brace and feels that it fits well.    ROS  Constitutional: No fever, no chills, not feeling tired, no recent weight gain and no recent weight loss  ENT: No nosebleeds  Cardiovascular: No chest pain  Respiratory: No shortness of breath and no cough  Gastrointestinal: No abdominal pain, no nausea, no diarrhea, and no vomiting  Musculoskeletal: No arthralgias  Integumentary: No rashes and no skin lesions  Neurological: No headache  Psychiatric: No sleep disturbances no depression  Endocrine: No muscle weakness and no muscle cramps  Hematologic/lymphatic: No swelling glands and no tendency for easy bruising    Past Medical History:   Diagnosis Date    Asthma     Stomach ulcer         Past Surgical History:   Procedure Laterality Date    FINGER SURGERY Right     OTHER SURGICAL HISTORY  02/02/2021    No history of surgery          Current Outpatient Medications:     multivitamin tablet, Take 1 tablet by mouth once daily., Disp: , Rfl:      No Known Allergies     Social Connections: Not on file            Objective   34-year-old male well appearing in no acute distress. Alert and oriented ×3.  Skin intact bilateral lower extremities.   Normal tandem gait. Coordination and balance intact.  Bilateral lower extremity compartments supple.  5 out of 5 distal motor strength bilaterally.  L4 through S1  sensation intact bilaterally.  2+ DP/PT pulses bilaterally.  Right knee incisions are healing nicely with minimal scarring.  No joint effusion.  Mild quad atrophy on the right when compared to the left.  Active range of motion 0 to 140 degrees, symmetric to the left knee.  Negative Lachman's.  No tenderness over the patellar tendon.      Assessment/Plan   Encounter Diagnoses:  Disruption of anterior cruciate ligament of knee, right, subsequent encounter    Acute lateral meniscus tear of left knee, subsequent encounter    No orders of the defined types were placed in this encounter.      We discussed the importance of returning back to physical therapy.  He needs to focus on his overall hip and quad strength.  He can use ice and over-the-counter medicines as needed for any soreness.  Will see him back again in 2 months.    This office note was dictated using Dragon voice to text software and was not proofread for spelling or grammatical errors

## 2025-03-05 ENCOUNTER — EVALUATION (OUTPATIENT)
Dept: PHYSICAL THERAPY | Facility: CLINIC | Age: 35
End: 2025-03-05
Payer: COMMERCIAL

## 2025-03-05 DIAGNOSIS — S83.511D DISRUPTION OF ANTERIOR CRUCIATE LIGAMENT OF KNEE, RIGHT, SUBSEQUENT ENCOUNTER: ICD-10-CM

## 2025-03-05 PROCEDURE — 97161 PT EVAL LOW COMPLEX 20 MIN: CPT | Mod: GP | Performed by: PHYSICAL THERAPIST

## 2025-03-05 PROCEDURE — 97110 THERAPEUTIC EXERCISES: CPT | Mod: GP | Performed by: PHYSICAL THERAPIST

## 2025-03-05 ASSESSMENT — ENCOUNTER SYMPTOMS
LOSS OF SENSATION IN FEET: 0
DEPRESSION: 0
OCCASIONAL FEELINGS OF UNSTEADINESS: 0

## 2025-03-05 NOTE — PROGRESS NOTES
Physical Therapy    Physical Therapy Evaluation and Treatment      Patient Name: Tobi Rosa  MRN: [id number]  Today's Date: 3/5/2025  Visit: 1  Insurance: Reviewed  Physician: Vin Guerrero  Problem List Items Addressed This Visit             ICD-10-CM    Disruption of anterior cruciate ligament of knee, right, subsequent encounter S83.511D    Relevant Orders    Follow Up In Physical Therapy        Time Entry:   Time Calculation  Start Time: 1040  Stop Time: 1115  Time Calculation (min): 35 min  PT Evaluation Time Entry  PT Evaluation (Low) Time Entry: 25  PT Therapeutic Procedures Time Entry  Therapeutic Exercise Time Entry: 10    Assessment: Patient seen in PT for Initial Evaluation for ACL tear s/p reconstruction.   Patient presents with postural deviation  decreased knee ROM , decreased knee strength, knee tenderness, difficulty with unilateral squat on right.  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  Patient rates LEFS at 80%.    PT Assessment  Rehab Prognosis: Good  Evaluation/Treatment Tolerance: Patient tolerated treatment well     Plan:  Continue with POC  Per Dr Sneed ACL rehab protocol including running, plyometics, and agility as appropriate   OP PT Plan  PT Plan: Skilled PT  PT Frequency: 1 time per week  Duration: 10  Onset Date: 01/27/25  Rehab Potential: Good  Plan of Care Agreement: Patient    Current Problem:   1. Disruption of anterior cruciate ligament of knee, right, subsequent encounter  Referral to Physical Therapy    Follow Up In Physical Therapy          Subjective    General: Patient with a history of ACL tear after grappling - July 2024 - for which patient had right knee anterior cruciate ligament reconstruction with patellar tendon autograft, lateral meniscus repair and IT band tenodesis  on 8/20/24.  No complications.   Patient treated with PT for 3 months then RTW.  Patient complains of pain in the region of the ant  lateral knee, stiff pain, 0/10 at worst last 7 days.  Patient's pain is worse with overextending knee.  Functionally, patient is unable to do running,, do sports.  Doing armor combat - but not going full out.  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         Precautions: fall risk, per Dr Sneed ACL protocol     Prior Level of Function: no limits        Objective     Postural deviation: knees slightly flexed in standing  right knee ROM to -5 extension and 135 flexion  right Knee strength 4/5 quads and 4/5 hamstrings  Gait deviation: normal gait  Tender to palpation at the medial joint line   Balance: 10 sec+ slsl  Special Tests: intact with lacman's on right   Patient with some difficulty and buckling with unilateral squat on right     Outcome Measures:  Other Measures  Lower Extremity Funtional Score (LEFS): 64     Treatments:  Patient instructed in HEP including: steps ups, step downs, unilateral heel raises, and unilateral mini squats 20X each (10 minutes)    EDUCATION:HEP       Goals:  Active       PT Problem       PT Goal 1       Start:  03/05/25    Expected End:  06/03/25       LEFS to 100%           PT Goal 2       Start:  03/05/25    Expected End:  06/03/25       5/5 knee strength           PT Goal 3       Start:  03/05/25    Expected End:  06/03/25       Patient able to return to armor combat

## 2025-03-12 ENCOUNTER — TREATMENT (OUTPATIENT)
Dept: PHYSICAL THERAPY | Facility: CLINIC | Age: 35
End: 2025-03-12
Payer: COMMERCIAL

## 2025-03-12 DIAGNOSIS — S83.511D DISRUPTION OF ANTERIOR CRUCIATE LIGAMENT OF KNEE, RIGHT, SUBSEQUENT ENCOUNTER: ICD-10-CM

## 2025-03-12 PROCEDURE — 97110 THERAPEUTIC EXERCISES: CPT | Mod: GP | Performed by: PHYSICAL THERAPIST

## 2025-03-12 NOTE — PROGRESS NOTES
Physical Therapy    Physical Therapy Treatment    Patient Name: Tobi Rosa  MRN: [id number]  Today's Date: 3/12/2025  Visit: 2/10  Insurance: [employer]  Authorization: 3/5/25-6/3/25  Time Entry:   Time Calculation  Start Time: 0445  Stop Time: 0530  Time Calculation (min): 45 min  PT Therapeutic Procedures Time Entry  Therapeutic Exercise Time Entry: 45      Assessment: Good understanding and compliance with HEP.       Plan:  Continue with POC        Current Problem  1. Disruption of anterior cruciate ligament of knee, right, subsequent encounter  Follow Up In Physical Therapy          General      0/10 knee pain last 2 days   Doing all normal ADL's and work activities     Subjective    Precautions: fall risk          Objective     Independent with HEP      Treatments:  Recumbent bike 5 minutes  Step stretch 20X   Calf stretch 20X  Reviewed HEP  Leg press unilateral 60# 20X each  Leg extension unilateral 15# 20X each  Double leg jumps 30X   FWD/BWD double leg jumps 20X  Side to side leg jumps 20X   Standing hip flex, ext and abd on foam 20X each  Lunges 20X   (45 minutes)      OP EDUCATION: HEP       Goals:  Active       PT Problem       PT Goal 1       Start:  03/05/25    Expected End:  06/03/25       LEFS to 100%           PT Goal 2       Start:  03/05/25    Expected End:  06/03/25       5/5 knee strength           PT Goal 3       Start:  03/05/25    Expected End:  06/03/25       Patient able to return to armor combat

## 2025-03-17 ENCOUNTER — TREATMENT (OUTPATIENT)
Dept: PHYSICAL THERAPY | Facility: CLINIC | Age: 35
End: 2025-03-17
Payer: COMMERCIAL

## 2025-03-17 DIAGNOSIS — S83.511D DISRUPTION OF ANTERIOR CRUCIATE LIGAMENT OF KNEE, RIGHT, SUBSEQUENT ENCOUNTER: ICD-10-CM

## 2025-03-17 PROCEDURE — 97110 THERAPEUTIC EXERCISES: CPT | Mod: GP,CQ

## 2025-03-17 ASSESSMENT — PAIN SCALES - GENERAL: PAINLEVEL_OUTOF10: 0 - NO PAIN

## 2025-03-17 ASSESSMENT — PAIN - FUNCTIONAL ASSESSMENT: PAIN_FUNCTIONAL_ASSESSMENT: 0-10

## 2025-03-17 NOTE — PROGRESS NOTES
Physical Therapy    Physical Therapy Treatment    Patient Name: Tobi Rosa  MRN: [id number]  Today's Date: 3/17/2025  Visit: 3/10  Insurance: [employer]  Authorization: 3/5/25-6/3/25  Time Entry:   Time Calculation  Start Time: 0315  Stop Time: 0345  Time Calculation (min): 30 min  PT Therapeutic Procedures Time Entry  Therapeutic Exercise Time Entry: 30    Assessment:   PT Assessment  Evaluation/Treatment Tolerance: Patient tolerated treatment well  Good understanding and replication of therex, minimal cueing needed. Moderate eccentric muscular shaking during LAQ on matrix machine. Fatigue/sob with jumping exercises. SL stationary hops added, no pain/discomfort, only fatigue.     Plan:  Continue with POC        Current Problem  1. Disruption of anterior cruciate ligament of knee, right, subsequent encounter  Follow Up In Physical Therapy        General      0/10 knee pain last 2 days   Doing all normal ADL's and work activities     Subjective    No pain at rest  Some soreness after workouts  HEP completed daily plus outdoor activites  No other updates or concerns at the moment    Precautions: fall risk     Pain Assessment  Pain Assessment: 0-10  0-10 (Numeric) Pain Score: 0 - No pain    Objective   Independent with HEP      Treatments:  Recumbent bike 5 minutes lvl 5  Step stretch 20X   Calf stretch 20X  Step calf raise 20X  Leg press unilateral 60# 20X each  Leg extension unilateral 15# 20X each (slow eccentric lowering)  Double leg jumps 30X   FWD/BWD double leg jumps 20X  Side to side leg jumps 20X   Single leg right stationary hop x 15  Lunges 20X   Standing hip flex, ext and abd on foam 20X each    30 minutes)    OP EDUCATION: HEP     Goals:  Active       PT Problem       PT Goal 1       Start:  03/05/25    Expected End:  06/03/25       LEFS to 100%           PT Goal 2       Start:  03/05/25    Expected End:  06/03/25       5/5 knee strength           PT Goal 3       Start:  03/05/25    Expected End:  06/03/25        Patient able to return to armor combat

## 2025-03-24 ENCOUNTER — OFFICE VISIT (OUTPATIENT)
Dept: ORTHOPEDIC SURGERY | Facility: HOSPITAL | Age: 35
End: 2025-03-24
Payer: COMMERCIAL

## 2025-03-24 ENCOUNTER — TREATMENT (OUTPATIENT)
Dept: PHYSICAL THERAPY | Facility: CLINIC | Age: 35
End: 2025-03-24
Payer: COMMERCIAL

## 2025-03-24 DIAGNOSIS — S83.511D DISRUPTION OF ANTERIOR CRUCIATE LIGAMENT OF KNEE, RIGHT, SUBSEQUENT ENCOUNTER: ICD-10-CM

## 2025-03-24 DIAGNOSIS — S83.511D DISRUPTION OF ANTERIOR CRUCIATE LIGAMENT OF KNEE, RIGHT, SUBSEQUENT ENCOUNTER: Primary | ICD-10-CM

## 2025-03-24 PROCEDURE — 99213 OFFICE O/P EST LOW 20 MIN: CPT | Performed by: PHYSICIAN ASSISTANT

## 2025-03-24 PROCEDURE — 1036F TOBACCO NON-USER: CPT | Performed by: PHYSICIAN ASSISTANT

## 2025-03-24 PROCEDURE — 97110 THERAPEUTIC EXERCISES: CPT | Mod: GP,CQ

## 2025-03-24 ASSESSMENT — PAIN - FUNCTIONAL ASSESSMENT
PAIN_FUNCTIONAL_ASSESSMENT: 0-10
PAIN_FUNCTIONAL_ASSESSMENT: 0-10

## 2025-03-24 ASSESSMENT — PAIN SCALES - GENERAL
PAINLEVEL_OUTOF10: 0 - NO PAIN
PAINLEVEL_OUTOF10: 0 - NO PAIN

## 2025-03-24 NOTE — PROGRESS NOTES
"Physical Therapy    Physical Therapy Treatment    Patient Name: Tobi Rosa  MRN: [id number]  Today's Date: 3/24/2025  Visit: 4/10  Insurance: [employer]  Authorization: 3/5/25-6/3/25  Time Entry:   Time Calculation  Start Time: 0400  Stop Time: 0440  Time Calculation (min): 40 min  PT Therapeutic Procedures Time Entry  Therapeutic Exercise Time Entry: 40    Assessment:   PT Assessment  Evaluation/Treatment Tolerance: Patient tolerated treatment well  Progressing strength and agility today. Patient does well without painful increase. Rest breaks given for general fatigue and LE fatigue. Good understanding and independence with therex.     Plan:  Continue with POC        Current Problem  1. Disruption of anterior cruciate ligament of knee, right, subsequent encounter  Follow Up In Physical Therapy        General      0/10 knee pain last 2 days   Doing all normal ADL's and work activities     Subjective    No pain   HEP completed  Some days are more sore than others  Check up yesterday, ortho advised increased agility/running    Precautions: fall risk     Pain Assessment  Pain Assessment: 0-10  0-10 (Numeric) Pain Score: 0 - No pain    Objective   Independent with HEP      Treatments:  Recumbent bike 5 minutes lvl 5  Step calf raise 20X  Leg press unilateral 60# 20X each  Leg extension unilateral 15# 20X each (slow eccentric lowering)  8\" step up x 15  Double leg jumps 30X  FWD/BWD double leg jumps 20X  Side to side leg jumps 20X   Single leg right stationary hop x 15  Lateral rapid steps through agility ladder x 5  In/out diagonals through agility ladder x 5  Double leg forward leaps along agility ladder (around 3 leaps for ladder length) x 4  Standing hip flex, ext and abd on foam 20X each    X 40 minutes    OP EDUCATION: HEP     Goals:  Active       PT Problem       PT Goal 1       Start:  03/05/25    Expected End:  06/03/25       LEFS to 100%           PT Goal 2       Start:  03/05/25    Expected End:  06/03/25       " 5/5 knee strength           PT Goal 3       Start:  03/05/25    Expected End:  06/03/25       Patient able to return to armor combat

## 2025-03-24 NOTE — PROGRESS NOTES
Subjective    Patient ID: Tobi Rosa is a 34 y.o. male.    Procedure: Right knee ACL reconstruction with patella tendon autograft, lateral meniscus repair, and IT band tenodesis  Date of surgery: 8/20/2024      HPI:  Tobi Rosa is a 34 y.o. male who is just over 7 months out from right knee ACL reconstruction with patella tendon autograft, lateral meniscus repair, and IT band tenodesis.  No problems or adverse events are reported.  No significant pain or discomfort.  He started with a new physical therapy  office in early March.  He states he has not started jogging yet in therapy but has done some on his own.  He presently denies any feelings of instability.    ROS  Constitutional: No fever, no chills, not feeling tired, no recent weight gain and no recent weight loss  ENT: No nosebleeds  Cardiovascular: No chest pain  Respiratory: No shortness of breath and no cough  Gastrointestinal: No abdominal pain, no nausea, no diarrhea, and no vomiting  Musculoskeletal: No arthralgias  Integumentary: No rashes and no skin lesions  Neurological: No headache  Psychiatric: No sleep disturbances no depression  Endocrine: No muscle weakness and no muscle cramps  Hematologic/lymphatic: No swelling glands and no tendency for easy bruising    Past Medical History:   Diagnosis Date    Asthma     Stomach ulcer         Past Surgical History:   Procedure Laterality Date    FINGER SURGERY Right     OTHER SURGICAL HISTORY  02/02/2021    No history of surgery          Current Outpatient Medications:     multivitamin tablet, Take 1 tablet by mouth once daily., Disp: , Rfl:      No Known Allergies     Social Connections: Not on file            Objective   34-year-old male well appearing in no acute distress. Alert and oriented ×3.  Skin intact bilateral lower extremities.   Normal tandem gait. Coordination and balance intact.  Bilateral lower extremity compartments supple.  5 out of 5 distal motor strength bilaterally.  L4 through  S1 sensation intact bilaterally.  2+ DP/PT pulses bilaterally.  Right knee incisions are well-healed with minimal scarring.  No joint effusion.  Improved quad tone.  Active range of motion 0 to 135 degrees, symmetric to the left knee.  No tenderness over the patellar tendon.  Negative Lachman's.        Assessment/Plan   Encounter Diagnoses:  Disruption of anterior cruciate ligament of knee, right, subsequent encounter    No orders of the defined types were placed in this encounter.      I would like him to begin a jogging program supervised by physical therapy and then continue to progress from there.  He has a desire to return back to high-level mixed martial arts.  He really needs to use the next couple of months to progress his activity level safely.  He should continue to ice his knee.  We will see him again in 2 months.    This office note was dictated using Dragon voice to text software and was not proofread for spelling or grammatical errors

## 2025-03-31 ENCOUNTER — APPOINTMENT (OUTPATIENT)
Dept: PHYSICAL THERAPY | Facility: CLINIC | Age: 35
End: 2025-03-31
Payer: COMMERCIAL

## 2025-04-17 ENCOUNTER — APPOINTMENT (OUTPATIENT)
Dept: PHYSICAL THERAPY | Facility: CLINIC | Age: 35
End: 2025-04-17
Payer: COMMERCIAL

## 2025-04-24 ENCOUNTER — APPOINTMENT (OUTPATIENT)
Dept: PHYSICAL THERAPY | Facility: CLINIC | Age: 35
End: 2025-04-24
Payer: COMMERCIAL

## 2025-05-01 ENCOUNTER — APPOINTMENT (OUTPATIENT)
Dept: PHYSICAL THERAPY | Facility: CLINIC | Age: 35
End: 2025-05-01
Payer: COMMERCIAL

## 2025-05-08 ENCOUNTER — APPOINTMENT (OUTPATIENT)
Dept: PHYSICAL THERAPY | Facility: CLINIC | Age: 35
End: 2025-05-08
Payer: COMMERCIAL

## 2025-05-12 ENCOUNTER — APPOINTMENT (OUTPATIENT)
Dept: ORTHOPEDIC SURGERY | Facility: HOSPITAL | Age: 35
End: 2025-05-12
Payer: COMMERCIAL

## (undated) DEVICE — SUTURE, MONOCRYL, 3-0, 27 IN, PS-2, UNDYED

## (undated) DEVICE — ENDOBUTTON, ACL CL PAC

## (undated) DEVICE — PADDING, UNDERCAST, WEBRIL, 6 IN X 4 YD, REG, NS

## (undated) DEVICE — PIN, PASSING 2.4 FLEXIBLE

## (undated) DEVICE — CONTAINER STERILE SPECIMEN 90ML, STERILE

## (undated) DEVICE — Device

## (undated) DEVICE — SUTURE, ULTRABRAID 2 BLUE

## (undated) DEVICE — GLOVE, SURGICAL, PROTEXIS PI BLUE W/NEUTHERA, 7.5, PF, LF

## (undated) DEVICE — DRESSING, ABDOMINAL, TENDERSORB, 8 X 7-1/2 IN, STERILE

## (undated) DEVICE — SUTURE, ETHILON, 3-0, 18 IN, PS1, BLACK

## (undated) DEVICE — NEEDLE, SPINAL, S/SU, 18GA 3IN, QUINCKE, STERILE

## (undated) DEVICE — COVER, TABLE, 44X90

## (undated) DEVICE — GLOVE, SURGICAL, PROTEXIS PI , 7.0, PF, LF

## (undated) DEVICE — TUBING, SUCTION, 6MM X 10, CLEAN N-COND

## (undated) DEVICE — GOWN, ECLIPSE, PREVENTION PLUS,  XXLARGE, XLONG

## (undated) DEVICE — PROBE, APOLLO RF, 50 DEG, MULTI PORT

## (undated) DEVICE — TIP, SUCTION, YANKAUER, FLEXIBLE

## (undated) DEVICE — TUBING, PUMP MAIN 16FT STERILE

## (undated) DEVICE — BANDAGE, COFLEX, 6 X 5 YDS, TAN, STERILE, LF

## (undated) DEVICE — SYRINGE, 60 CC, IRRIGATION, BULB, CONTRO-BULB, PAPER POUCH

## (undated) DEVICE — CUTTER, BONE, 5.5 X 13

## (undated) DEVICE — GLOVE, SURGICAL, PROTEXIS PI , 8.0, PF, LF

## (undated) DEVICE — TUBING, DUAL WAVE, OUTFLOW

## (undated) DEVICE — BLANKET, LOWER BODY, VHA PLUS, ADULT

## (undated) DEVICE — DRAPE, SHEET, 17 X 23 IN

## (undated) DEVICE — STRIP, SKIN CLOSURE, STERI STRIP, REINFORCED, 0.5 X 4 IN

## (undated) DEVICE — PENCIL, ELECTROSURG, W/BUTTON SWITCH & HOLSTER, EZ CLEAN, DISP

## (undated) DEVICE — SUTURE, CTD, VICRYL, 2-0, UND, BR, CT-2

## (undated) DEVICE — SUTURE, VICRYL, 0, 27 IN, CT-2, UNDYED

## (undated) DEVICE — 10IN STRYKER SMOKE EVACUATION TUBING

## (undated) DEVICE — DRESSING, GAUZE, PETROLATUM, XEROFORM, 4 X 4, PEELABLE FOIL

## (undated) DEVICE — GOWN, ASTOUND, XL

## (undated) DEVICE — GLOVES, SURG BIOGEL, SZ-8.5, PF, LF

## (undated) DEVICE — DISPOSABLE KIT, KNEE FIBERTAKS

## (undated) DEVICE — SHAVER, SABRETOOTH, CURVED, 4.0MM X 13CM

## (undated) DEVICE — SPONGE, GAUZE, AVANT, STERILE, NONWOVEN, 4PLY, 4 X 4, STANDARD

## (undated) DEVICE — BANDAGE, ESMARK, 6 IN X 12 FT